# Patient Record
Sex: FEMALE | Race: WHITE | NOT HISPANIC OR LATINO | URBAN - METROPOLITAN AREA
[De-identification: names, ages, dates, MRNs, and addresses within clinical notes are randomized per-mention and may not be internally consistent; named-entity substitution may affect disease eponyms.]

---

## 2018-07-03 DIAGNOSIS — I10 ESSENTIAL HYPERTENSION: Primary | ICD-10-CM

## 2018-07-03 RX ORDER — METOPROLOL TARTRATE 50 MG/1
TABLET, FILM COATED ORAL
Qty: 60 TABLET | Refills: 5 | Status: SHIPPED | OUTPATIENT
Start: 2018-07-03 | End: 2019-01-01 | Stop reason: SDUPTHER

## 2019-01-01 DIAGNOSIS — I10 ESSENTIAL HYPERTENSION: ICD-10-CM

## 2019-01-01 RX ORDER — METOPROLOL TARTRATE 50 MG/1
TABLET, FILM COATED ORAL
Qty: 60 TABLET | Refills: 5 | Status: SHIPPED | OUTPATIENT
Start: 2019-01-01 | End: 2019-06-29 | Stop reason: SDUPTHER

## 2019-02-08 DIAGNOSIS — I10 BENIGN ESSENTIAL HTN: Primary | ICD-10-CM

## 2019-02-08 RX ORDER — AMLODIPINE BESYLATE 5 MG/1
TABLET ORAL
Qty: 90 TABLET | Refills: 3 | Status: SHIPPED | OUTPATIENT
Start: 2019-02-08 | End: 2020-02-09

## 2019-06-29 DIAGNOSIS — I10 ESSENTIAL HYPERTENSION: ICD-10-CM

## 2019-06-29 RX ORDER — METOPROLOL TARTRATE 50 MG/1
TABLET, FILM COATED ORAL
Qty: 60 TABLET | Refills: 5 | Status: SHIPPED | OUTPATIENT
Start: 2019-06-29 | End: 2019-12-30 | Stop reason: SDUPTHER

## 2019-12-30 DIAGNOSIS — I10 ESSENTIAL HYPERTENSION: ICD-10-CM

## 2020-01-03 RX ORDER — METOPROLOL TARTRATE 50 MG/1
TABLET, FILM COATED ORAL
Qty: 60 TABLET | Refills: 5 | Status: SHIPPED | OUTPATIENT
Start: 2020-01-03 | End: 2020-09-16 | Stop reason: SDUPTHER

## 2020-02-09 DIAGNOSIS — I10 BENIGN ESSENTIAL HTN: ICD-10-CM

## 2020-02-09 RX ORDER — AMLODIPINE BESYLATE 5 MG/1
TABLET ORAL
Qty: 90 TABLET | Refills: 3 | Status: SHIPPED | OUTPATIENT
Start: 2020-02-09 | End: 2020-09-18 | Stop reason: SDUPTHER

## 2020-09-16 DIAGNOSIS — I10 ESSENTIAL HYPERTENSION: ICD-10-CM

## 2020-09-16 RX ORDER — METOPROLOL TARTRATE 50 MG/1
50 TABLET, FILM COATED ORAL 2 TIMES DAILY WITH MEALS
Qty: 14 TABLET | Refills: 0 | Status: SHIPPED | OUTPATIENT
Start: 2020-09-16 | End: 2020-09-18 | Stop reason: SDUPTHER

## 2020-09-16 NOTE — TELEPHONE ENCOUNTER
refill   Received: Today   Message Contents   [facility] Clinical               Metoprolol tartrate 50 mg bid     [facility], [address] [facility] Vera Fuller     Just made a virtual appt for her for Friday

## 2020-09-18 ENCOUNTER — TELEMEDICINE (OUTPATIENT)
Dept: CARDIOLOGY CLINIC | Facility: CLINIC | Age: 70
End: 2020-09-18
Payer: MEDICARE

## 2020-09-18 VITALS — HEART RATE: 64 BPM | SYSTOLIC BLOOD PRESSURE: 135 MMHG | WEIGHT: 218 LBS | DIASTOLIC BLOOD PRESSURE: 80 MMHG

## 2020-09-18 DIAGNOSIS — I10 ESSENTIAL HYPERTENSION: ICD-10-CM

## 2020-09-18 DIAGNOSIS — I10 BENIGN ESSENTIAL HTN: ICD-10-CM

## 2020-09-18 PROCEDURE — 99213 OFFICE O/P EST LOW 20 MIN: CPT | Performed by: INTERNAL MEDICINE

## 2020-09-18 RX ORDER — METOPROLOL TARTRATE 50 MG/1
50 TABLET, FILM COATED ORAL 2 TIMES DAILY WITH MEALS
Qty: 180 TABLET | Refills: 5 | Status: SHIPPED | OUTPATIENT
Start: 2020-09-18 | End: 2021-12-12

## 2020-09-18 RX ORDER — AMLODIPINE BESYLATE 5 MG/1
5 TABLET ORAL DAILY
Qty: 90 TABLET | Refills: 5 | Status: SHIPPED | OUTPATIENT
Start: 2020-09-18 | End: 2021-11-04

## 2020-09-18 NOTE — PROGRESS NOTES
Virtual Regular Visit      Assessment/Plan:    Problem List Items Addressed This Visit        Cardiovascular and Mediastinum    Benign essential HTN     Has been well controlled at home  She has not seen a primary care physician in several years  I encouraged her to do so but in the meantime I am happy to renew her medications  Relevant Medications    amLODIPine (NORVASC) 5 mg tablet    metoprolol tartrate (LOPRESSOR) 50 mg tablet      Other Visit Diagnoses     Essential hypertension        Relevant Medications    amLODIPine (NORVASC) 5 mg tablet    metoprolol tartrate (LOPRESSOR) 50 mg tablet            Follow up Plan:  1 year EKG and follow-up visit  I told her that if she sees her primary care physician routinely ankle gets the meds renewed through them follow-up with me can be as needed  HPI:  Patient is seen in follow-up regarding the above  This is a video visit  She has not been seen by physician in several years  Blood pressure at home has been okay  I last saw her in 2018  This was for hypertension but she also had a prior trauma and  subdural hematoma  No recent change in exertional tolerance  No change in weight or appetite    Most recent or relevant cardiac/vascular testing:    Echocardiogram 03/12/2012:  Normal   EKG 02/20/2012:  Sinus tachycardia  Otherwise normal       History reviewed  No pertinent surgical history  CMP: No results found for: NA, K, CL, CO2, BUN, CREATININE, GLUCOSE, EGFR    Lipid Profile: No results found for: CHOL, TRIG, HDL, LDL      Review of Systems   10  point ROS  was otherwise non pertinent or negative except as per HPI or as below  Gait: Normal     Objective:       PHYSICAL EXAM:    This was a virtual visit and no formal exam could be performed  Home vitals were: /80   Pulse 64   Wt 98 9 kg (218 lb)     Animated  No breathlessness  Alert and oriented        Current Outpatient Medications:     amLODIPine (NORVASC) 5 mg tablet, Take 1 tablet (5 mg total) by mouth daily, Disp: 90 tablet, Rfl: 5    metoprolol tartrate (LOPRESSOR) 50 mg tablet, Take 1 tablet (50 mg total) by mouth 2 (two) times a day with meals, Disp: 180 tablet, Rfl: 5  Allergies not on file  History reviewed  No pertinent past medical history  Social History     Tobacco Use   Smoking Status Not on file                  Reason for visit is   Chief Complaint   Patient presents with    Hypertension    Virtual Regular Visit        Encounter provider Andrew Gonzalez MD    Provider located at [address] [address]      Recent Visits  No visits were found meeting these conditions  Showing recent visits within past 7 days and meeting all other requirements     Today's Visits  Date Type Provider Dept   09/18/20 Telemedicine Andrew Gonzalez MD [facility] today's visits and meeting all other requirements     Future Appointments  No visits were found meeting these conditions  Showing future appointments within next 150 days and meeting all other requirements        The patient was identified by name and date of birth  Emily Mendoza was informed that this is a telemedicine visit and that the visit is being conducted through [employer]  My office door was closed  No one else was in the room  She acknowledged consent and understanding of privacy and security of the video platform  The patient has agreed to participate and understands they can discontinue the visit at any time  Patient is aware this is a billable service  Subjective  Emily Mendoza is a 72 y o  female     HPI     History reviewed  No pertinent past medical history  History reviewed  No pertinent surgical history      Current Outpatient Medications   Medication Sig Dispense Refill    amLODIPine (NORVASC) 5 mg tablet Take 1 tablet (5 mg total) by mouth daily 90 tablet 5    metoprolol tartrate (LOPRESSOR) 50 mg tablet Take 1 tablet (50 mg total) by mouth 2 (two) times a day with meals 180 tablet 5     No current facility-administered medications for this visit  Allergies not on file    Review of Systems    Video Exam    Vitals:    09/18/20 1020   BP: 135/80   Pulse: 64   Weight: 98 9 kg (218 lb)       Physical Exam     I spent 20 minutes directly with the patient during this visit      VIRTUAL VISIT DISCLAIMER    Virginia Mix acknowledges that she has consented to an online visit or consultation  She understands that the online visit is based solely on information provided by her, and that, in the absence of a face-to-face physical evaluation by the physician, the diagnosis she receives is both limited and provisional in terms of accuracy and completeness  This is not intended to replace a full medical face-to-face evaluation by the physician  Virginia Mix understands and accepts these terms

## 2020-09-18 NOTE — ASSESSMENT & PLAN NOTE
Has been well controlled at home  She has not seen a primary care physician in several years  I encouraged her to do so but in the meantime I am happy to renew her medications 
Parent(s)

## 2021-11-04 DIAGNOSIS — I10 BENIGN ESSENTIAL HTN: ICD-10-CM

## 2021-11-04 RX ORDER — AMLODIPINE BESYLATE 5 MG/1
TABLET ORAL
Qty: 90 TABLET | Refills: 5 | Status: SHIPPED | OUTPATIENT
Start: 2021-11-04

## 2021-12-11 DIAGNOSIS — I10 ESSENTIAL HYPERTENSION: ICD-10-CM

## 2021-12-12 RX ORDER — METOPROLOL TARTRATE 50 MG/1
TABLET, FILM COATED ORAL
Qty: 180 TABLET | Refills: 5 | Status: SHIPPED | OUTPATIENT
Start: 2021-12-12 | End: 2022-04-13 | Stop reason: DRUGHIGH

## 2022-04-09 ENCOUNTER — APPOINTMENT (EMERGENCY)
Dept: RADIOLOGY | Facility: HOSPITAL | Age: 72
End: 2022-04-09
Payer: MEDICARE

## 2022-04-09 ENCOUNTER — HOSPITAL ENCOUNTER (EMERGENCY)
Facility: HOSPITAL | Age: 72
Discharge: HOME | End: 2022-04-09
Attending: EMERGENCY MEDICINE | Admitting: EMERGENCY MEDICINE
Payer: MEDICARE

## 2022-04-09 ENCOUNTER — APPOINTMENT (EMERGENCY)
Dept: CT IMAGING | Facility: HOSPITAL | Age: 72
End: 2022-04-09
Payer: MEDICARE

## 2022-04-09 VITALS
WEIGHT: 218 LBS | HEIGHT: 64 IN | HEART RATE: 58 BPM | TEMPERATURE: 98.2 F | RESPIRATION RATE: 18 BRPM | BODY MASS INDEX: 37.22 KG/M2 | OXYGEN SATURATION: 93 % | SYSTOLIC BLOOD PRESSURE: 217 MMHG | DIASTOLIC BLOOD PRESSURE: 98 MMHG

## 2022-04-09 DIAGNOSIS — I10 ASYMPTOMATIC HYPERTENSION: Primary | ICD-10-CM

## 2022-04-09 DIAGNOSIS — R73.9 HYPERGLYCEMIA: ICD-10-CM

## 2022-04-09 DIAGNOSIS — Z13.220 SCREENING FOR LIPID DISORDERS: ICD-10-CM

## 2022-04-09 LAB
2HR DELTA HS TROPONIN: 0 NG/L
ALBUMIN SERPL BCP-MCNC: 3.1 G/DL (ref 3.5–5)
ALP SERPL-CCNC: 128 U/L (ref 46–116)
ALT SERPL W P-5'-P-CCNC: 30 U/L (ref 12–78)
ANION GAP SERPL CALCULATED.3IONS-SCNC: 8 MMOL/L (ref 4–13)
AST SERPL W P-5'-P-CCNC: 19 U/L (ref 5–45)
BACTERIA UR QL AUTO: ABNORMAL /HPF
BASOPHILS # BLD AUTO: 0.05 THOUSANDS/ΜL (ref 0–0.1)
BASOPHILS NFR BLD AUTO: 1 % (ref 0–1)
BILIRUB SERPL-MCNC: 0.73 MG/DL (ref 0.2–1)
BILIRUB UR QL STRIP: NEGATIVE
BUN SERPL-MCNC: 16 MG/DL (ref 5–25)
CALCIUM ALBUM COR SERPL-MCNC: 9.7 MG/DL (ref 8.3–10.1)
CALCIUM SERPL-MCNC: 9 MG/DL (ref 8.3–10.1)
CARDIAC TROPONIN I PNL SERPL HS: 9 NG/L
CARDIAC TROPONIN I PNL SERPL HS: 9 NG/L
CHLORIDE SERPL-SCNC: 99 MMOL/L (ref 100–108)
CLARITY UR: CLEAR
CO2 SERPL-SCNC: 28 MMOL/L (ref 21–32)
COLOR UR: YELLOW
CREAT SERPL-MCNC: 1 MG/DL (ref 0.6–1.3)
EOSINOPHIL # BLD AUTO: 0.31 THOUSAND/ΜL (ref 0–0.61)
EOSINOPHIL NFR BLD AUTO: 4 % (ref 0–6)
ERYTHROCYTE [DISTWIDTH] IN BLOOD BY AUTOMATED COUNT: 12.9 % (ref 11.6–15.1)
EST. AVERAGE GLUCOSE BLD GHB EST-MCNC: 292 MG/DL
GFR SERPL CREATININE-BSD FRML MDRD: 56 ML/MIN/1.73SQ M
GLUCOSE SERPL-MCNC: 300 MG/DL (ref 65–140)
GLUCOSE SERPL-MCNC: 358 MG/DL (ref 65–140)
GLUCOSE UR STRIP-MCNC: ABNORMAL MG/DL
HBA1C MFR BLD: 11.8 %
HCT VFR BLD AUTO: 40.6 % (ref 34.8–46.1)
HGB BLD-MCNC: 13.8 G/DL (ref 11.5–15.4)
HGB UR QL STRIP.AUTO: NEGATIVE
IMM GRANULOCYTES # BLD AUTO: 0.05 THOUSAND/UL (ref 0–0.2)
IMM GRANULOCYTES NFR BLD AUTO: 1 % (ref 0–2)
KETONES UR STRIP-MCNC: NEGATIVE MG/DL
LEUKOCYTE ESTERASE UR QL STRIP: ABNORMAL
LYMPHOCYTES # BLD AUTO: 1.58 THOUSANDS/ΜL (ref 0.6–4.47)
LYMPHOCYTES NFR BLD AUTO: 22 % (ref 14–44)
MCH RBC QN AUTO: 30.1 PG (ref 26.8–34.3)
MCHC RBC AUTO-ENTMCNC: 34 G/DL (ref 31.4–37.4)
MCV RBC AUTO: 89 FL (ref 82–98)
MONOCYTES # BLD AUTO: 0.75 THOUSAND/ΜL (ref 0.17–1.22)
MONOCYTES NFR BLD AUTO: 10 % (ref 4–12)
MUCOUS THREADS UR QL AUTO: ABNORMAL
NEUTROPHILS # BLD AUTO: 4.5 THOUSANDS/ΜL (ref 1.85–7.62)
NEUTS SEG NFR BLD AUTO: 62 % (ref 43–75)
NITRITE UR QL STRIP: NEGATIVE
NON-SQ EPI CELLS URNS QL MICRO: ABNORMAL /HPF
NRBC BLD AUTO-RTO: 0 /100 WBCS
OTHER STN SPEC: ABNORMAL
PH UR STRIP.AUTO: 7 [PH] (ref 4.5–8)
PLATELET # BLD AUTO: 266 THOUSANDS/UL (ref 149–390)
PMV BLD AUTO: 10.8 FL (ref 8.9–12.7)
POTASSIUM SERPL-SCNC: 3.8 MMOL/L (ref 3.5–5.3)
PROT SERPL-MCNC: 7.7 G/DL (ref 6.4–8.2)
PROT UR STRIP-MCNC: ABNORMAL MG/DL
RBC # BLD AUTO: 4.59 MILLION/UL (ref 3.81–5.12)
RBC #/AREA URNS AUTO: ABNORMAL /HPF
SODIUM SERPL-SCNC: 135 MMOL/L (ref 136–145)
SP GR UR STRIP.AUTO: 1.02 (ref 1–1.03)
TSH SERPL DL<=0.05 MIU/L-ACNC: 2.03 UIU/ML (ref 0.45–4.5)
UROBILINOGEN UR QL STRIP.AUTO: 0.2 E.U./DL
WBC # BLD AUTO: 7.24 THOUSAND/UL (ref 4.31–10.16)
WBC #/AREA URNS AUTO: ABNORMAL /HPF

## 2022-04-09 PROCEDURE — 81001 URINALYSIS AUTO W/SCOPE: CPT

## 2022-04-09 PROCEDURE — 82948 REAGENT STRIP/BLOOD GLUCOSE: CPT

## 2022-04-09 PROCEDURE — 80053 COMPREHEN METABOLIC PANEL: CPT | Performed by: PHYSICIAN ASSISTANT

## 2022-04-09 PROCEDURE — 70450 CT HEAD/BRAIN W/O DYE: CPT

## 2022-04-09 PROCEDURE — G1004 CDSM NDSC: HCPCS

## 2022-04-09 PROCEDURE — 84443 ASSAY THYROID STIM HORMONE: CPT | Performed by: PHYSICIAN ASSISTANT

## 2022-04-09 PROCEDURE — 96361 HYDRATE IV INFUSION ADD-ON: CPT

## 2022-04-09 PROCEDURE — 96360 HYDRATION IV INFUSION INIT: CPT

## 2022-04-09 PROCEDURE — 80061 LIPID PANEL: CPT

## 2022-04-09 PROCEDURE — 83036 HEMOGLOBIN GLYCOSYLATED A1C: CPT | Performed by: PHYSICIAN ASSISTANT

## 2022-04-09 PROCEDURE — 84484 ASSAY OF TROPONIN QUANT: CPT | Performed by: PHYSICIAN ASSISTANT

## 2022-04-09 PROCEDURE — 99285 EMERGENCY DEPT VISIT HI MDM: CPT

## 2022-04-09 PROCEDURE — 93005 ELECTROCARDIOGRAM TRACING: CPT

## 2022-04-09 PROCEDURE — 85025 COMPLETE CBC W/AUTO DIFF WBC: CPT | Performed by: PHYSICIAN ASSISTANT

## 2022-04-09 PROCEDURE — 99285 EMERGENCY DEPT VISIT HI MDM: CPT | Performed by: PHYSICIAN ASSISTANT

## 2022-04-09 PROCEDURE — 83721 ASSAY OF BLOOD LIPOPROTEIN: CPT

## 2022-04-09 PROCEDURE — 71045 X-RAY EXAM CHEST 1 VIEW: CPT

## 2022-04-09 PROCEDURE — 36415 COLL VENOUS BLD VENIPUNCTURE: CPT | Performed by: PHYSICIAN ASSISTANT

## 2022-04-09 RX ORDER — AMLODIPINE BESYLATE 5 MG/1
5 TABLET ORAL ONCE
Status: COMPLETED | OUTPATIENT
Start: 2022-04-09 | End: 2022-04-09

## 2022-04-09 RX ORDER — METOPROLOL TARTRATE 50 MG/1
50 TABLET, FILM COATED ORAL ONCE
Status: COMPLETED | OUTPATIENT
Start: 2022-04-09 | End: 2022-04-09

## 2022-04-09 RX ADMIN — SODIUM CHLORIDE 1000 ML: 0.9 INJECTION, SOLUTION INTRAVENOUS at 13:36

## 2022-04-09 RX ADMIN — METFORMIN HYDROCHLORIDE 500 MG: 500 TABLET ORAL at 16:39

## 2022-04-09 RX ADMIN — METOPROLOL TARTRATE 50 MG: 50 TABLET, FILM COATED ORAL at 16:39

## 2022-04-09 RX ADMIN — AMLODIPINE BESYLATE 5 MG: 5 TABLET ORAL at 14:48

## 2022-04-09 NOTE — ED PROVIDER NOTES
History  Chief Complaint   Patient presents with    Altered Mental Status     patient's daughter states that she has been "off" for the last few days and slower than normal, not confused     Patient is a 67-year-old female with history of hypertension who presents to the emergency department with her daughter for evaluation of altered mental status  The patient herself has no complaints  She states that she feels completely fine  The patient's daughter states that she has noticed that over the last few days, her mother has been off  She feels that she has been slower to respond and not acting like herself  Per the daughter, her mother lives with her at home and takes care of her child  The patient's daughter states that she ultimately made her mother come to the emergency department for evaluation because she is very concerned  The patient states that she was recently constipated, but that has since resolved  She denies fever, chills, chest pain, shortness of breath, abdominal pain, nausea, vomiting, headache, dizziness or lightheadedness  History provided by:  Patient   used: No    Altered Mental Status  Presenting symptoms: confusion    Associated symptoms: no abdominal pain, no fever, no headaches, no light-headedness, no nausea, no rash and no vomiting        Prior to Admission Medications   Prescriptions Last Dose Informant Patient Reported? Taking? amLODIPine (NORVASC) 5 mg tablet 4/9/2022 at Unknown time  No Yes   Sig: TAKE ONE TABLET BY MOUTH EVERY DAY   metoprolol tartrate (LOPRESSOR) 50 mg tablet 4/9/2022 at Unknown time  No Yes   Sig: TAKE ONE TABLET BY MOUTH TWICE A DAY WITH MEALS      Facility-Administered Medications: None       Past Medical History:   Diagnosis Date    Hypertension        Past Surgical History:   Procedure Laterality Date    ABDOMINAL SURGERY         History reviewed  No pertinent family history    I have reviewed and agree with the history as documented  E-Cigarette/Vaping    E-Cigarette Use Never User      E-Cigarette/Vaping Substances    Nicotine No     THC No     CBD No     Flavoring No     Other No     Unknown No      Social History     Tobacco Use    Smoking status: Never Smoker    Smokeless tobacco: Never Used   Vaping Use    Vaping Use: Never used   Substance Use Topics    Alcohol use: Yes     Alcohol/week: 1 0 standard drink     Types: 1 Glasses of wine per week     Comment: 1 glass of wine weekly    Drug use: Never       Review of Systems   Constitutional: Negative for chills and fever  HENT: Negative for ear pain and sore throat  Eyes: Negative for redness and visual disturbance  Respiratory: Negative for cough and shortness of breath  Cardiovascular: Negative for chest pain  Gastrointestinal: Negative for abdominal pain, diarrhea, nausea and vomiting  Genitourinary: Negative for dysuria and hematuria  Musculoskeletal: Negative for back pain, neck pain and neck stiffness  Skin: Negative for color change and rash  Neurological: Negative for dizziness, light-headedness and headaches  Psychiatric/Behavioral: Positive for confusion  All other systems reviewed and are negative  Physical Exam  Physical Exam  Vitals and nursing note reviewed  Constitutional:       General: She is not in acute distress  Appearance: She is well-developed  She is not ill-appearing or toxic-appearing  HENT:      Head: Normocephalic and atraumatic  Mouth/Throat:      Pharynx: Uvula midline  Eyes:      General: Lids are normal       Conjunctiva/sclera: Conjunctivae normal    Cardiovascular:      Rate and Rhythm: Normal rate and regular rhythm  Heart sounds: Normal heart sounds  Pulmonary:      Effort: Pulmonary effort is normal       Breath sounds: Normal breath sounds  Abdominal:      General: There is no distension  Palpations: Abdomen is soft  Tenderness: There is no abdominal tenderness  Musculoskeletal:      Cervical back: Normal range of motion and neck supple  Skin:     General: Skin is warm and dry  Neurological:      Mental Status: She is alert and oriented to person, place, and time  Cranial Nerves: Cranial nerves are intact  Sensory: Sensation is intact  Motor: Motor function is intact  Coordination: Coordination is intact  Gait: Gait is intact           Vital Signs  ED Triage Vitals   Temperature Pulse Respirations Blood Pressure SpO2   04/09/22 1157 04/09/22 1157 04/09/22 1157 04/09/22 1157 04/09/22 1157   98 2 °F (36 8 °C) 66 18 (!) 169/101 97 %      Temp Source Heart Rate Source Patient Position - Orthostatic VS BP Location FiO2 (%)   04/09/22 1157 04/09/22 1157 04/09/22 1400 04/09/22 1400 --   Oral Monitor Lying Right arm       Pain Score       04/09/22 1157       No Pain           Vitals:    04/09/22 1500 04/09/22 1546 04/09/22 1600 04/09/22 1640   BP: (!) 258/120 (!) 231/111 (!) 230/98 (!) 217/98   Pulse: 60  58    Patient Position - Orthostatic VS: Lying Lying Lying Lying         Visual Acuity  Visual Acuity      Most Recent Value   L Pupil Size (mm) 3   R Pupil Size (mm) 3          ED Medications  Medications   sodium chloride 0 9 % bolus 1,000 mL (0 mL Intravenous Stopped 4/9/22 1639)   amLODIPine (NORVASC) tablet 5 mg (5 mg Oral Given 4/9/22 1448)   metoprolol tartrate (LOPRESSOR) tablet 50 mg (50 mg Oral Given 4/9/22 1639)   metFORMIN (GLUCOPHAGE) tablet 500 mg (500 mg Oral Given 4/9/22 1639)       Diagnostic Studies  Results Reviewed     Procedure Component Value Units Date/Time    Urine Microscopic [[id number]]  (Abnormal) Collected: 04/09/22 1507    Lab Status: Final result Specimen: Urine, Clean Catch Updated: 04/09/22 1621     RBC, UA None Seen /hpf      WBC, UA 0-1 /hpf      Epithelial Cells Occasional /hpf      Bacteria, UA Occasional /hpf      OTHER OBSERVATIONS Yeast Cells Present     MUCUS THREADS Occasional    HS Troponin I 2hr [[id number]] (Normal) Collected: 04/09/22 1456    Lab Status: Final result Specimen: Blood from Arm, Right Updated: 04/09/22 1544     hs TnI 2hr 9 ng/L      Delta 2hr hsTnI 0 ng/L     Urine Macroscopic, POC [[id number]]  (Abnormal) Collected: 04/09/22 1507    Lab Status: Final result Specimen: Urine Updated: 04/09/22 1508     Color, UA Yellow     Clarity, UA Clear     pH, UA 7 0     Leukocytes, UA Elevated glucose may cause decreased leukocyte values  See urine microscopic for [facility] result/     Nitrite, UA Negative     Protein, UA 30 (1+) mg/dl      Glucose, UA >=1000 (1%) mg/dl      Ketones, UA Negative mg/dl      Urobilinogen, UA 0 2 E U /dl      Bilirubin, UA Negative     Blood, UA Negative     Specific Gravity, UA 1 020    Narrative:      CLINITEK RESULT    Hemoglobin A1C [[id number]] Collected: 04/09/22 1234    Lab Status: In process Specimen: Blood from Arm, Right Updated: 04/09/22 1508    Fingerstick Glucose (POCT) [[id number]]  (Abnormal) Collected: 04/09/22 1448    Lab Status: Final result Updated: 04/09/22 1501     POC Glucose 300 mg/dl     TSH [[id number]]  (Normal) Collected: 04/09/22 1234    Lab Status: Final result Specimen: Blood from Arm, Right Updated: 04/09/22 1310     TSH 3RD GENERATON 2 027 uIU/mL     Narrative:      Patients undergoing fluorescein dye angiography may retain small amounts of fluorescein in the body for 48-72 hours post procedure  Samples containing fluorescein can produce falsely depressed TSH values  If the patient had this procedure,a specimen should be resubmitted post fluorescein clearance        HS Troponin 0hr (reflex protocol) [[id number]]  (Normal) Collected: 04/09/22 1234    Lab Status: Final result Specimen: Blood from Arm, Right Updated: 04/09/22 1309     hs TnI 0hr 9 ng/L     Comprehensive metabolic panel [[id number]]  (Abnormal) Collected: 04/09/22 1234    Lab Status: Final result Specimen: Blood from Arm, Right Updated: 04/09/22 1302     Sodium 135 mmol/L      Potassium 3 8 mmol/L Chloride 99 mmol/L      CO2 28 mmol/L      ANION GAP 8 mmol/L      BUN 16 mg/dL      Creatinine 1 00 mg/dL      Glucose 358 mg/dL      Calcium 9 0 mg/dL      Corrected Calcium 9 7 mg/dL      AST 19 U/L      ALT 30 U/L      Alkaline Phosphatase 128 U/L      Total Protein 7 7 g/dL      Albumin 3 1 g/dL      Total Bilirubin 0 73 mg/dL      eGFR 56 ml/min/1 73sq m     Narrative:      National Kidney Disease Foundation guidelines for Chronic Kidney Disease (CKD):     Stage 1 with normal or high GFR (GFR > 90 mL/min/1 73 square meters)    Stage 2 Mild CKD (GFR = 60-89 mL/min/1 73 square meters)    Stage 3A Moderate CKD (GFR = 45-59 mL/min/1 73 square meters)    Stage 3B Moderate CKD (GFR = 30-44 mL/min/1 73 square meters)    Stage 4 Severe CKD (GFR = 15-29 mL/min/1 73 square meters)    Stage 5 End Stage CKD (GFR <15 mL/min/1 73 square meters)  Note: GFR calculation is accurate only with a steady state creatinine    CBC and differential [[id number]] Collected: 04/09/22 1234    Lab Status: Final result Specimen: Blood from Arm, Right Updated: 04/09/22 1245     WBC 7 24 Thousand/uL      RBC 4 59 Million/uL      Hemoglobin 13 8 g/dL      Hematocrit 40 6 %      MCV 89 fL      MCH 30 1 pg      MCHC 34 0 g/dL      RDW 12 9 %      MPV 10 8 fL      Platelets 499 Thousands/uL      nRBC 0 /100 WBCs      Neutrophils Relative 62 %      Immat GRANS % 1 %      Lymphocytes Relative 22 %      Monocytes Relative 10 %      Eosinophils Relative 4 %      Basophils Relative 1 %      Neutrophils Absolute 4 50 Thousands/µL      Immature Grans Absolute 0 05 Thousand/uL      Lymphocytes Absolute 1 58 Thousands/µL      Monocytes Absolute 0 75 Thousand/µL      Eosinophils Absolute 0 31 Thousand/µL      Basophils Absolute 0 05 Thousands/µL                  XR chest 1 view portable   ED Interpretation by Maggi Gibson PA-C (04/09 7555)   No acute cardiopulmonary disease      CT head without contrast   Final Result by Bala Pereira MD (04/09 1253)      No acute intracranial abnormality  Workstation performed: [id number]                    Procedures  ECG 12 Lead Documentation Only    Date/Time: 4/9/2022 12:41 PM  Performed by: Deshaun Julio PA-C  Authorized by: Betty Waggoner PA-C     Comments:      Normal sinus rhythm at 60  Normal axis  Right bundle-branch block  No acute ST-T changes  No previous EKG available for comparison  ED Course  ED Course as of 04/09/22 1706   Sat Apr 09, 2022   1319 Glucose, Random(!): 358   1345 Anion Gap: 8   1458 5 mg amlodipine ordered due to persistently elevated blood pressure  Patient continues to deny any symptoms  1547 POC Glucose(!): 300   1547 Delta 2hr hsTnI: 0   1633 Patient's blood pressure is steadily improving, however remains significantly elevated  Patient continues to remain completely asymptomatic at this time  I did offer to admit her for observation into the hospital, however she wishes to go home  Patient will get her nighttime dose of metoprolol  I will also give her her 1st dose of metformin  She understands the need for close follow-up  SBIRT 22yo+      Most Recent Value   SBIRT (22 yo +)    In order to provide better care to our patients, we are screening all of our patients for alcohol and drug use  Would it be okay to ask you these screening questions? Yes Filed at: 04/09/2022 1208   Initial Alcohol Screen: US AUDIT-C     1  How often do you have a drink containing alcohol? 0 Filed at: 04/09/2022 1208   2  How many drinks containing alcohol do you have on a typical day you are drinking? 0 Filed at: 04/09/2022 1208   3a  Male UNDER 65: How often do you have five or more drinks on one occasion? 0 Filed at: 04/09/2022 1208   3b  FEMALE Any Age, or MALE 65+: How often do you have 4 or more drinks on one occassion?  0 Filed at: 04/09/2022 1208   Audit-C Score 0 Filed at: 04/09/2022 1208   JENSEN: How many times in the past year have you    Used an illegal drug or used a prescription medication for non-medical reasons? Never Filed at: 04/09/2022 1208                    MDM  Number of Diagnoses or Management Options  Asymptomatic hypertension: new and requires workup  Hyperglycemia: new and requires workup  Diagnosis management comments: The patient presents for evaluation of altered mental status  The patient herself has no complaints  She is alert and oriented  The patient's daughter has noted some subtle changes over the last few days that are concerning her  On my exam, the patient is awake, alert and oriented  Neurologic exam is without any deficit  Differential includes but is not limited to UTI versus pneumonia versus early dementia versus ACS versus TIA versus CVA  Labs, imaging and EKG were ordered  Labs reviewed  Notable for elevated blood glucose level at 358  Patient does not have prior diabetes diagnosis  Per her daughter, the patient has not been to see a doctor in person in over 4 years  Patient was given a L of fluids, and blood sugar level did improved to 300  Patient will be started on metformin  Patient's labs were otherwise unremarkable  She had 2 troponins completed while in the ED, both of which were within normal limits with the delta change of 0  EKG is without acute ischemic change  CT of head is without any acute findings  Of note, the patient's blood pressure did spike while in the ED  She is adamantly denying any symptoms  She states that she has been compliant with her blood pressure medications  I did give her an additional 5 mg of amlodipine  There was slow but steady improvement of patient's blood pressure  I did ultimately discuss admission with the patient, however she wishes to be discharged home today    She understands that her blood pressure is still significantly elevated, and that she will require close follow-up for both that as well as new diagnosis of diabetes  I did give the patient a prescription for course of metformin  She is requesting that I give her information for primary care doctors  in the area as her current primary care doctor is an hour away  I provided information per her request     Patient was given her 1st dose of metformin as well as her evening dose of metoprolol prior to leaving the emergency department today  She was given strict return to ED precautions for any worsening symptoms or if she develops chest pain, shortness of breath, dizziness or headache  She acknowledged understanding  Patient is stable for discharge  Amount and/or Complexity of Data Reviewed  Clinical lab tests: ordered and reviewed  Tests in the radiology section of CPT®: ordered and reviewed  Decide to obtain previous medical records or to obtain history from someone other than the patient: yes  Obtain history from someone other than the patient: yes  Review and summarize past medical records: yes  Discuss the patient with other providers: yes (Dr Shivani Carmona)    Risk of Complications, Morbidity, and/or Mortality  Presenting problems: moderate  Diagnostic procedures: low  Management options: low    Patient Progress  Patient progress: improved      Disposition  Final diagnoses:   Asymptomatic hypertension   Hyperglycemia     Time reflects when diagnosis was documented in both MDM as applicable and the Disposition within this note     Time User Action Codes Description Comment    4/9/2022  4:43 PM Mariia Peterson [I10] Asymptomatic hypertension     4/9/2022  4:44 PM Mariia Peterson [R73 9] Hyperglycemia       ED Disposition     ED Disposition Condition Date/Time Comment    Discharge Stable Sat Apr 9, 2022  4:43 PM Cyndy Caballero discharge to home/self care              Follow-up Information     Follow up With Specialties Details Why Contact Info Additional Information    [facility] [place] [facility] Schedule an appointment as soon as possible for a visit in 3 days  [address] [address]  [phone] DO SCG[facility] Internal [facility] [address] [place] [address], [address] [place] [address] Medicine Schedule an appointment as soon as possible for a visit in 3 days  [address], [address] [address]  [address] [place] [address] [address], [place], [facility] Tamra Caciola 1159 [phone]          Discharge Medication List as of 4/9/2022  4:46 PM      START taking these medications    Details   metFORMIN (GLUCOPHAGE) 500 mg tablet Take 1 tablet (500 mg total) by mouth 2 (two) times a day with meals, Starting Sat 4/9/2022, Normal         CONTINUE these medications which have NOT CHANGED    Details   amLODIPine (NORVASC) 5 mg tablet TAKE ONE TABLET BY MOUTH EVERY DAY, Normal      metoprolol tartrate (LOPRESSOR) 50 mg tablet TAKE ONE TABLET BY MOUTH TWICE A DAY WITH MEALS, Normal             No discharge procedures on file      PDMP Review     None          ED Provider  Electronically Signed by           Ifrah Pittman PA-C  04/09/22 7282

## 2022-04-12 ENCOUNTER — OFFICE VISIT (OUTPATIENT)
Dept: FAMILY MEDICINE CLINIC | Facility: CLINIC | Age: 72
End: 2022-04-12
Payer: MEDICARE

## 2022-04-12 VITALS
SYSTOLIC BLOOD PRESSURE: 122 MMHG | HEIGHT: 64 IN | HEART RATE: 70 BPM | OXYGEN SATURATION: 97 % | WEIGHT: 195.1 LBS | DIASTOLIC BLOOD PRESSURE: 80 MMHG | RESPIRATION RATE: 15 BRPM | BODY MASS INDEX: 33.31 KG/M2

## 2022-04-12 DIAGNOSIS — Z12.11 SCREENING FOR MALIGNANT NEOPLASM OF COLON: ICD-10-CM

## 2022-04-12 DIAGNOSIS — E78.2 MIXED HYPERLIPIDEMIA: ICD-10-CM

## 2022-04-12 DIAGNOSIS — Z13.220 SCREENING FOR LIPID DISORDERS: ICD-10-CM

## 2022-04-12 DIAGNOSIS — Z12.31 ENCOUNTER FOR SCREENING MAMMOGRAM FOR MALIGNANT NEOPLASM OF BREAST: ICD-10-CM

## 2022-04-12 DIAGNOSIS — E11.9 TYPE 2 DIABETES MELLITUS WITHOUT COMPLICATION, WITHOUT LONG-TERM CURRENT USE OF INSULIN (HCC): Primary | ICD-10-CM

## 2022-04-12 DIAGNOSIS — E66.01 OBESITY, MORBID (HCC): ICD-10-CM

## 2022-04-12 DIAGNOSIS — Z78.0 MENOPAUSE: ICD-10-CM

## 2022-04-12 DIAGNOSIS — N18.30 STAGE 3 CHRONIC KIDNEY DISEASE, UNSPECIFIED WHETHER STAGE 3A OR 3B CKD (HCC): ICD-10-CM

## 2022-04-12 DIAGNOSIS — Z79.899 ON STATIN THERAPY: ICD-10-CM

## 2022-04-12 PROBLEM — S06.5XAA SDH (SUBDURAL HEMATOMA): Status: ACTIVE | Noted: 2018-05-02

## 2022-04-12 PROBLEM — I10 HTN (HYPERTENSION): Status: RESOLVED | Noted: 2018-05-03 | Resolved: 2022-04-12

## 2022-04-12 PROBLEM — S06.5X9A SDH (SUBDURAL HEMATOMA) (HCC): Status: ACTIVE | Noted: 2018-05-02

## 2022-04-12 PROBLEM — I10 HTN (HYPERTENSION): Status: ACTIVE | Noted: 2018-05-03

## 2022-04-12 PROBLEM — S42.202A CLOSED FRACTURE OF LEFT PROXIMAL HUMERUS: Status: ACTIVE | Noted: 2019-03-29

## 2022-04-12 LAB
CHOLEST SERPL-MCNC: 263 MG/DL
HDLC SERPL-MCNC: 35 MG/DL
LDLC SERPL DIRECT ASSAY-MCNC: 111 MG/DL (ref 0–100)
TRIGL SERPL-MCNC: 632 MG/DL

## 2022-04-12 PROCEDURE — 99204 OFFICE O/P NEW MOD 45 MIN: CPT | Performed by: NURSE PRACTITIONER

## 2022-04-12 RX ORDER — PIOGLITAZONEHYDROCHLORIDE 15 MG/1
15 TABLET ORAL DAILY
Qty: 30 TABLET | Refills: 2 | Status: SHIPPED | OUTPATIENT
Start: 2022-04-12 | End: 2022-06-28 | Stop reason: SDUPTHER

## 2022-04-12 RX ORDER — METFORMIN HYDROCHLORIDE 500 MG/1
1000 TABLET, EXTENDED RELEASE ORAL
Qty: 180 TABLET | Refills: 1 | Status: SHIPPED | OUTPATIENT
Start: 2022-04-12 | End: 2022-06-07 | Stop reason: SDUPTHER

## 2022-04-12 RX ORDER — ATORVASTATIN CALCIUM 40 MG/1
40 TABLET, FILM COATED ORAL EVERY EVENING
Qty: 90 TABLET | Refills: 1 | Status: SHIPPED | OUTPATIENT
Start: 2022-04-12 | End: 2022-04-13 | Stop reason: DRUGHIGH

## 2022-04-12 NOTE — PATIENT INSTRUCTIONS
Dr Bert Riedel Dr  [facility] [place] [address] Bryce Deforest Dr [facility]  10% - bad control"> 10% - bad control,Hemoglobin A1c (HbA1c) greater than 10% indicating poor diabetic control,Haemoglobin A1c greater than 10% indicating poor diabetic control">   Diabetes Mellitus Type 2 in Adults, Ambulatory Care   GENERAL INFORMATION:   Diabetes mellitus type 2  is a disease that affects how your body uses glucose (sugar)  Insulin helps move sugar out of the blood so it can be used for energy  Normally, when the blood sugar level increases, the pancreas makes more insulin  Type 2 diabetes develops because either the body cannot make enough insulin, or it cannot use the insulin correctly  After many years, your pancreas may stop making insulin  Common symptoms include the following:   · More hunger or thirst than usual     · Frequent urination     · Weight loss without trying     · Blurred vision  Seek immediate care for the following symptoms:   · Severe abdominal pain, or pain that spreads to your back  You may also be vomiting  · Trouble staying awake or focusing    · Shaking or sweating    · Blurred or double vision    · Breath has a fruity, sweet smell    · Breathing is deep and labored, or rapid and shallow    · Heartbeat is fast and weak  Treatment for diabetes mellitus type 2  includes keeping your blood sugar at a normal level  You must eat the right foods, and exercise regularly  You may also need medicine if you cannot control your blood sugar level with nutrition and exercise  Manage diabetes mellitus type 2:   · Check your blood sugar level  You will be taught how to check a small drop of blood in a glucose monitor  Ask your healthcare provider when and how often to check during the day  Ask your healthcare provider what your blood sugar levels should be when you check them  · Keep track of carbohydrates (sugar and starchy foods)    Your blood sugar level can get too high if you eat too many carbohydrates  Your dietitian will help you plan meals and snacks that have the right amount of carbohydrates  · Eat low-fat foods  Some examples are skinless chicken and low-fat milk  · Eat less sodium (salt)  Some examples of high-sodium foods to limit are soy sauce, potato chips, and soup  Do not add salt to food you cook  Limit your use of table salt  · Eat high-fiber foods  Foods that are a good source of fiber include vegetables, whole grain bread, and beans  · Limit alcohol  Alcohol affects your blood sugar level and can make it harder to manage your diabetes  Women should limit alcohol to 1 drink a day  Men should limit alcohol to 2 drinks a day  A drink of alcohol is 12 ounces of beer, 5 ounces of wine, or 1½ ounces of liquor  · Get regular exercise  Exercise can help keep your blood sugar level steady, decrease your risk of heart disease, and help you lose weight  Exercise for at least 30 minutes, 5 days a week  Include muscle strengthening activities 2 days each week  Work with your healthcare provider to create an exercise plan  · Check your feet each day  for injuries or open sores  Ask your healthcare provider for activities you can do if you have an open sore  · Quit smoking  If you smoke, it is never too late to quit  Smoking can worsen the problems that may occur with diabetes  Ask your healthcare provider for information about how to stop smoking if you are having trouble quitting  · Ask about your weight:  Ask healthcare providers if you need to lose weight, and how much to lose  Ask them to help you with a weight loss program  Even a 10 to 15 pound weight loss can help you manage your blood sugar level  · Carry medical alert identification  Wear medical alert jewelry or carry a card that says you have diabetes  Ask your healthcare provider where to get these items  · Ask about vaccines    Diabetes puts you at risk of serious illness if you get the flu, pneumonia, or hepatitis  Ask your healthcare provider if you should get a flu, pneumonia, or hepatitis B vaccine, and when to get the vaccine  Follow up with your healthcare provider as directed:  Write down your questions so you remember to ask them during your visits  CARE AGREEMENT:   You have the right to help plan your care  Learn about your health condition and how it may be treated  Discuss treatment options with your caregivers to decide what care you want to receive  You always have the right to refuse treatment  The above information is an  only  It is not intended as medical advice for individual conditions or treatments  Talk to your doctor, nurse or pharmacist before following any medical regimen to see if it is safe and effective for you  © 2014 [address] is for End User's use only and may not be sold, redistributed or otherwise used for commercial purposes  All illustrations and images included in CareNotes® are the copyrighted property of A D A M [employer] Inc  or Marcus Montez  Basic Carbohydrate Counting   AMBULATORY CARE:   Carbohydrate counting  is a way to plan your meals by counting the amount of carbohydrate in foods  Carbohydrates are the sugars, starches, and fiber found in fruit, grains, vegetables, and milk products  Carbohydrates increase your blood sugar levels  Carbohydrate counting can help you eat the right amount of carbohydrate to keep your blood sugar levels under control  What you need to know about planning meals using carbohydrate counting:  · A dietitian or healthcare provider will help you develop a healthy meal plan that works best for you  You will be taught how much carbohydrate to eat or drink for each meal and snack  Your meal plan will be based on your age, weight, usual food intake, and physical activity level   If you have diabetes, it will also include your blood sugar levels and diabetes medicine  Once you know how much carbohydrate you should eat, you can decide what type of food you want to eat  · You will need to know what foods contain carbohydrate and how much they contain  Keep track of the amount of carbohydrate in meals and snacks in order to follow your meal plan  Do not avoid carbohydrates or skip meals  Your blood sugar may fall too low if you do not eat enough carbohydrate or you skip meals  Foods that contain carbohydrate:   · Breads:  Each serving of food listed below contains about 15 g of carbohydrate   ? 1 slice of bread (1 ounce) or 1 flour or corn tortilla (6 inch)    ? ½ of a hamburger bun or ¼ of a large bagel (about 1 ounce)    ? 1 pancake (about 4 inches across and ¼ inch thick)    · Cereals and grains:  Serving sizes of ready-to-eat cereals vary  Look at the serving size and the total carbohydrate amount listed on the food label  Each serving of food listed below contains about 15 g of carbohydrate   ? ¾ cup of dry, unsweetened, ready-to-eat cereal or ¼ cup of low-fat granola     ? ½ cup of oatmeal or other cooked cereal     ? ? cup of cooked rice or pasta    · Starchy vegetables and beans:  Each serving of food listed below contains about 15 g of carbohydrate   ? ½ cup of corn, green peas, sweet potatoes, or mashed potatoes    ? ¼ of a large baked potato    ? ½ cup of beans, lentils, and peas (garbanzo, hunt, kidney, white, split, black-eyed)    · Crackers and snacks:  Each serving of food listed below contains about 15 g of carbohydrate   ? 3 ava cracker squares or 8 animal crackers     ? 6 saltine-type crackers    ? 3 cups of popcorn or ¾ ounce of pretzels, potato chips, or tortilla chips    · Fruit:  Each serving of food listed below contains about 15 g of carbohydrate   ? 1 small (4 ounce) piece of fresh fruit or ¾ to 1 cup of fresh fruit    ? ½ cup of canned or frozen fruit, packed in natural juice    ?  ½ cup (4 ounces) of unsweetened fruit juice    ? 2 tablespoons of dried fruit    · Desserts or sugary foods:  Each serving of food listed below contains about 15 g of carbohydrate   ? 2-inch square unfrosted cake or brownie     ? 2 small cookies    ? ½ cup of ice cream, frozen yogurt, or nondairy frozen yogurt    ? ¼ cup of sherbet or sorbet    ? 1 tablespoon of regular syrup, jam, or jelly    ? 2 tablespoons of light syrup    · Milk and yogurt:  Foods from the milk group contain about 12 g of carbohydrate per serving  ? 1 cup of fat-free or low-fat milk    ? 1 cup of soy milk    ? ? cup of fat-free, yogurt sweetened with artificial sweetener    · Non-starchy vegetables:  Each serving contains about 5 g of carbohydrate   Three servings of non-starch vegetables count as 1 carbohydrate serving  ? ½ cup of cooked vegetables or 1 cup of raw vegetables  This includes beets, broccoli, cabbage, cauliflower, cucumber, mushrooms, tomatoes, and zucchini    ? ½ cup of vegetable juice    How to use carbohydrate counting to plan meals:   · Count carbohydrate amounts using serving sizes:      ? Pasta dinner example: You plan to have pasta, tossed salad, and an 8-ounce glass of milk  Your healthcare provider tells you that you may have 4 carbohydrate servings for dinner  One carbohydrate serving of pasta is ? cup  One cup of pasta will equal 3 carbohydrate servings  An 8-ounce glass of milk will count as 1 carbohydrate serving  These amounts of food would equal 4 carbohydrate servings  One cup of tossed salad does not count toward your carbohydrate servings  · Count carbohydrate amounts using food labels:  Find the total amount of carbohydrate in a packaged food by reading the food label  Food labels tell you the serving size of the food and the total carbohydrate amount in each serving  Find the serving size on the food label and then decide how many servings you will eat   Multiply the number of servings you plan to eat by the carbohydrate amount per serving  ? Granola bar snack example: Your meal plan allows you to have 2 carbohydrate servings (30 grams) of carbohydrate for a snack  You plan to eat 1 package of granola bars, which contains 2 bars  According to the food label, the serving size of food in this package is 1 bar  Each serving (1 bar) contains 25 grams of carbohydrate  The total amount of carbohydrate in this package of granola bars would be 50 g  Based on your meal plan, you should eat only 1 bar  Follow up with your doctor as directed:  Write down your questions so you remember to ask them during your visits  © Copyright [employer] 2022 Information is for End User's use only and may not be sold, redistributed or otherwise used for commercial purposes  All illustrations and images included in CareNotes® are the copyrighted property of A D A M , Inc  or Thomas Small   The above information is an  only  It is not intended as medical advice for individual conditions or treatments  Talk to your doctor, nurse or pharmacist before following any medical regimen to see if it is safe and effective for you  Foot Care for People with Diabetes   AMBULATORY CARE:   What you need to know about foot care:   · Foot care helps protect your feet and prevent foot ulcers or sores  Long-term high blood sugar levels can damage the blood vessels and nerves in your legs and feet  This damage makes it hard to feel pressure, pain, temperature, and touch  You may not be able to feel a cut or sore, or shoes that are too tight  Foot care is needed to prevent serious problems, such as an infection or amputation  · Diabetes may cause your toes to become crooked or curved under  These changes may affect the way you walk and can lead to increased pressure on your foot  The pressure can decrease blood flow to your feet  Lack of blood flow increases your risk for a foot ulcer   Do not ignore small problems, such as dry skin or small wounds  These can become life-threatening over time without proper care  Call your care team provider if:   · Your feet become numb, weak, or hard to move  · You have pus draining from a sore on your foot  · You have a wound on your foot that gets bigger, deeper, or does not heal      · You see blisters, cuts, scratches, calluses, or sores on your foot  · You have a fever, and your feet become red, warm, and swollen  · Your toenails become thick, curled, or yellow  · You find it hard to check your feet because your vision is poor  · You have questions or concerns about your condition or care  How to care for your feet:   · Check your feet each day  Look at your whole foot, including the bottom, and between and under your toes  Check for wounds, corns, and calluses  Use a mirror to see the bottom of your feet  The skin on your feet may be shiny, tight, or darker than normal  Your feet may also be cold and pale  Feel your feet by running your hands along the tops, bottoms, sides, and between your toes  Redness, swelling, and warmth are signs of blood flow problems that can lead to a foot ulcer  Do not try to remove corns or calluses yourself  · Wash your feet each day with soap and warm water  Do not use hot water, because this can injure your foot  Dry your feet gently with a towel after you wash them  Dry between and under your toes  · Apply lotion or a moisturizer on your dry feet  Ask your care team provider what lotions are best to use  Do not put lotion or moisturizer between your toes  Moisture between your toes could lead to skin breakdown  · Cut your toenails correctly  File or cut your toenails straight across  Use a soft brush to clean around your toenails  If your toenails are very thick, you may need to have a care team provider or specialist cut them  · Protect your feet  Do not walk barefoot or wear your shoes without socks   Check your shoes for rocks or other objects that can hurt your feet  Wear cotton socks to help keep your feet dry  Wear socks without toe seams, or wear them with the seams inside out  Change your socks each day  Do not wear socks that are dirty or damp  · Wear shoes that fit well  Wear shoes that do not rub against any area of your feet  Your shoes should be ½ to ¾ inch (1 to 2 centimeters) longer than your feet  Your shoes should also have extra space around the widest part of your feet  Walking or athletic shoes with laces or straps that adjust are best  Ask your care team provider for help to choose shoes that fit you best  Ask him or her if you need to wear an insert, orthotic, or bandage on your feet  · Go to your follow-up visits  Your care team provider will do a foot exam at least once a year  You may need a foot exam more often if you have nerve damage, foot deformities, or ulcers  He will check for nerve damage and how well you can feel your feet  He will check your shoes to see if they fit well  · Do not smoke  Smoking can damage your blood vessels and put you at increased risk for foot ulcers  Ask your care team provider for information if you currently smoke and need help to quit  E-cigarettes or smokeless tobacco still contain nicotine  Talk to your care team provider before you use these products  Follow up with your diabetes care team provider or foot specialist as directed: You will need to have your feet checked at least once a year  You may need a foot exam more often if you have nerve damage, foot deformities, or ulcers  Write down your questions so you remember to ask them during your visits  © Copyright [employer] 2022 Information is for End User's use only and may not be sold, redistributed or otherwise used for commercial purposes   All illustrations and images included in CareNotes® are the copyrighted property of [employer]  or Thomas Doran  The above information is an  only  It is not intended as medical advice for individual conditions or treatments  Talk to your doctor, nurse or pharmacist before following any medical regimen to see if it is safe and effective for you  What to Do if Your Blood Sugar is Low   AMBULATORY CARE:   Low blood sugar levels  (hypoglycemia) can happen with Type 1 and Type 2 diabetes  Low levels are more likely to happen if you use insulin  Hypoglycemia can cause you to have falls, accidents, and injuries  A blood sugar level that gets too low can lead to seizures, coma, and death  Learn to recognize the symptoms early so you can get treatment quickly  When your blood sugar is low you may feel:  · Sweaty    · Nervous or shaky    · Anxious or irritable    · Confused    · A fast, pounding heartbeat    · Extremely hungry    Have someone call your local emergency number (911 in the [address] [facility]) if:   · You cannot be woken  · You have a seizure  Call your doctor if:   · You have symptoms of a low blood sugar level, such as trouble thinking, sweating, or a pounding heartbeat  · Your blood sugar level is lower than normal and it does not improve with treatment  · You often have lower blood sugar levels than your target goals  · You have trouble coping with your illness, or you feel anxious or depressed  · You have questions or concerns about your condition or care  What to do if you have symptoms of low blood sugar:   · Check your blood sugar level, if possible  Your blood sugar level is too low if it is at or below 70 mg/dL  · Eat or drink 15 grams of fast-acting carbohydrate  Fast-acting carbohydrates will raise your blood sugar level quickly  Examples of 15 grams of fast-acting carbohydrates:     ? 4 ounces (½ cup) of fruit juice     ? 4 ounces of regular soda    ? 2 tablespoons of raisins     ? 1 tube of glucose gel or 3 to 4 glucose tablets       · Check your blood sugar level 15 minutes later    If the level is still low (less than 100 mg/dL), eat another 15 grams of carbohydrate  When the level returns to 100 mg/dL, eat a snack or meal that contains carbohydrates  This will help prevent another drop in blood sugar  · Teach people close to you how to use your glucagon kit  Your blood sugar may be too low for you to be awake  People need to know when and how to use your kit  Prevent low blood sugar levels:  Prevent low blood sugar by knowing what increases your risk  Ask your healthcare provider for ways to prevent low blood sugar levels  Any of the following can increase your risk of low blood sugar:  · Fasting for tests or procedures    · During or after intense exercise    · Late or postponed meals    · Sleeping (you may need a bedtime snack)     · Drinking alcohol if you use insulin or insulin releasing pills    Follow up with your doctor as directed:  Write down your questions so you remember to ask them during your visits  © [employer] 2022 Information is for End User's use only and may not be sold, redistributed or otherwise used for commercial purposes  All illustrations and images included in CareNotes® are the copyrighted property of A D A M [employer]  or [employer]  The above information is an  only  It is not intended as medical advice for individual conditions or treatments  Talk to your doctor, nurse or pharmacist before following any medical regimen to see if it is safe and effective for you  10% - bad control"> 10% - bad control,Hemoglobin A1c (HbA1c) greater than 10% indicating poor diabetic control,Haemoglobin A1c greater than 10% indicating poor diabetic control">   Diabetes Mellitus Type 2 in Adults, Ambulatory Care   GENERAL INFORMATION:   Diabetes mellitus type 2  is a disease that affects how your body uses glucose (sugar)  Insulin helps move sugar out of the blood so it can be used for energy   Normally, when the blood sugar level increases, the pancreas makes more insulin  Type 2 diabetes develops because either the body cannot make enough insulin, or it cannot use the insulin correctly  After many years, your pancreas may stop making insulin  Common symptoms include the following:   · More hunger or thirst than usual     · Frequent urination     · Weight loss without trying     · Blurred vision  Seek immediate care for the following symptoms:   · Severe abdominal pain, or pain that spreads to your back  You may also be vomiting  · Trouble staying awake or focusing    · Shaking or sweating    · Blurred or double vision    · Breath has a fruity, sweet smell    · Breathing is deep and labored, or rapid and shallow    · Heartbeat is fast and weak  Treatment for diabetes mellitus type 2  includes keeping your blood sugar at a normal level  You must eat the right foods, and exercise regularly  You may also need medicine if you cannot control your blood sugar level with nutrition and exercise  Manage diabetes mellitus type 2:   · Check your blood sugar level  You will be taught how to check a small drop of blood in a glucose monitor  Ask your healthcare provider when and how often to check during the day  Ask your healthcare provider what your blood sugar levels should be when you check them  · Keep track of carbohydrates (sugar and starchy foods)  Your blood sugar level can get too high if you eat too many carbohydrates  Your dietitian will help you plan meals and snacks that have the right amount of carbohydrates  · Eat low-fat foods  Some examples are skinless chicken and low-fat milk  · Eat less sodium (salt)  Some examples of high-sodium foods to limit are soy sauce, potato chips, and soup  Do not add salt to food you cook  Limit your use of table salt  · Eat high-fiber foods  Foods that are a good source of fiber include vegetables, whole grain bread, and beans  · Limit alcohol    Alcohol affects your blood sugar level and can make it harder to manage your diabetes  Women should limit alcohol to 1 drink a day  Men should limit alcohol to 2 drinks a day  A drink of alcohol is 12 ounces of beer, 5 ounces of wine, or 1½ ounces of liquor  · Get regular exercise  Exercise can help keep your blood sugar level steady, decrease your risk of heart disease, and help you lose weight  Exercise for at least 30 minutes, 5 days a week  Include muscle strengthening activities 2 days each week  Work with your healthcare provider to create an exercise plan  · Check your feet each day  for injuries or open sores  Ask your healthcare provider for activities you can do if you have an open sore  · Quit smoking  If you smoke, it is never too late to quit  Smoking can worsen the problems that may occur with diabetes  Ask your healthcare provider for information about how to stop smoking if you are having trouble quitting  · Ask about your weight:  Ask healthcare providers if you need to lose weight, and how much to lose  Ask them to help you with a weight loss program  Even a 10 to 15 pound weight loss can help you manage your blood sugar level  · Carry medical alert identification  Wear medical alert jewelry or carry a card that says you have diabetes  Ask your healthcare provider where to get these items  · Ask about vaccines  Diabetes puts you at risk of serious illness if you get the flu, pneumonia, or hepatitis  Ask your healthcare provider if you should get a flu, pneumonia, or hepatitis B vaccine, and when to get the vaccine  Follow up with your healthcare provider as directed:  Write down your questions so you remember to ask them during your visits  CARE AGREEMENT:   You have the right to help plan your care  Learn about your health condition and how it may be treated  Discuss treatment options with your caregivers to decide what care you want to receive  You always have the right to refuse treatment   The above information is an educational aid only  It is not intended as medical advice for individual conditions or treatments  Talk to your doctor, nurse or pharmacist before following any medical regimen to see if it is safe and effective for you  © 2014 [address] is for End User's use only and may not be sold, redistributed or otherwise used for commercial purposes  All illustrations and images included in CareNotes® are the copyrighted property of A D A M [employer]  or Marcus Montez  Type 2 Diabetes in Adults: New Diagnosis   AMBULATORY CARE:   Type 2 diabetes  is a disease that affects how your body uses glucose (sugar)  Normally, when the blood sugar level increases, the pancreas makes more insulin  Insulin helps move sugar out of the blood so it can be used for energy  Type 2 diabetes develops because either the body cannot make enough insulin, or it cannot use the insulin correctly  Type 2 diabetes can be controlled to prevent damage to your heart, blood vessels, and other organs  Common symptoms include the following:   · Numbness in your fingers or toes    · Blurred vision    · Frequent urination    · More hunger or thirst than usual    Have someone call your local emergency number (911 in the [address] [facility]) if:   · You have any of the following signs of a stroke:      ? Numbness or drooping on one side of your face     ? Weakness in an arm or leg    ? Confusion or difficulty speaking    ? Dizziness, a severe headache, or vision loss    · You have any of the following signs of a heart attack:      ? Squeezing, pressure, or pain in your chest    ? You may  also have any of the following:     § Discomfort or pain in your back, neck, jaw, stomach, or arm    § Shortness of breath    § Nausea or vomiting    § Lightheadedness or a sudden cold sweat    · You have trouble breathing  Seek care immediately if:   · You have severe abdominal pain  · You vomit for more than 2 hours      · You have trouble staying awake or focusing  · You are shaking or sweating  · You feel weak or more tired than usual     · You have blurred or double vision  · Your breath has a fruity, sweet smell  Call your doctor or diabetes care team if:   · Your arms and legs are swollen  · You have an upset stomach and cannot eat the foods on your meal plan  · You feel dizzy, have headaches, or are easily irritated  · Your skin is red, warm, dry, or swollen  · You have a wound that does not heal     · You have numbness in your arms or legs  · You have trouble coping with your illness, or you feel anxious or depressed  · You have questions or concerns about your condition or care  Treatment for type 2 diabetes  helps prevent or delay complications, including heart and kidney disease  You must eat healthy meals and do regular physical activity  You may  need any of the following:  · Diabetes medicines or insulin  may be given to decrease the amount of sugar in your blood  · Blood pressure medicine  may be given to lower your blood pressure  · Cholesterol-lowering medicine  may be given to prevent heart disease  · Antiplatelets , such as aspirin, help prevent blood clots  Take your antiplatelet medicine exactly as directed  These medicines make it more likely for you to bleed or bruise  If you are told to take aspirin, do not take acetaminophen or ibuprofen instead  · Take your medicine as directed  Contact your healthcare provider if you think your medicine is not helping or if you have side effects  Tell him or her if you are allergic to any medicine  Keep a list of the medicines, vitamins, and herbs you take  Include the amounts, and when and why you take them  Bring the list or the pill bottles to follow-up visits  Carry your medicine list with you in case of an emergency  Diabetes education:  Diabetes education will start right away  Diabetes education may also happen later to refresh your memory  Your diabetes care team may include physicians, nurse practitioners, and physician assistants  It may also include nurses, dietitians, exercise specialists, pharmacists, dentists, and podiatrists  Family members, or others who are close to you, may also be part of the team  You and your team will make goals and plans to manage diabetes and other health problems  The plans and goals will be specific to your needs  Members of your diabetes care team teach you the following:  · About nutrition:  A dietitian will help you make a meal plan to keep your blood sugar level steady  You will learn how food affects your blood sugar levels  You will also learn to keep track of sugar and starchy foods (carbohydrates)  Do not skip meals  Your blood sugar level may drop too low if you have taken diabetes medicine and do not eat  You may be taught to use the plate method for portion control  With the plate method, ½ of your plate contains vegetables  The other half is divided so that ¼ contains protein or meat, and ¼ contains starches, such as potatoes  · About physical activity and diabetes: You will learn why physical activity, such as walking, is important  You and your care team provider will make a plan for your activity  Your care team provider will tell you what a healthy weight is for you  He or she will help you make a plan to get to that weight and stay there  Maintain a healthy weight to help delay or prevent complications of diabetes  · About your blood sugar level: You will learn what your blood sugar level should be  You will be given information on when and how to check your blood sugar level  You may need to check by testing a drop of blood in a glucose monitor  You may instead be given a continuous glucose monitoring (CGM) device  A sensor is placed in your abdomen or on your arm  You put a transmitter on the sensor to get a reading that shows up on the monitor   You will learn what to do if your level is too high or too low  Write down the times of your checks and your levels  Take them to all follow-up appointments  · About diabetes medicine: You may need oral diabetes medicine, insulin, or both to help control your blood sugar levels  Your healthcare provider will teach you how and when to take oral diabetes medicine  You will also be taught about side effects oral diabetes medicine can cause  Insulin may be added if oral diabetes medicine becomes less effective over time  Insulin may be injected, or given through a pump or pen  You and your care team will discuss which method is best for you  ? An insulin pump  is an implanted device that gives your insulin 24 hours a day  An insulin pump prevents the need for multiple insulin injections in a day  ? An insulin pen  is a device prefilled with the right amount of insulin  ? You and your family members will be taught how to draw up and give insulin  if this is the best method for you  Your education team will also teach you how to dispose of needles and syringes  ? You will learn how much insulin you need  and when to give it  You will be taught when not to give insulin  You will also be taught what to do if your blood sugar level drops too low  This may happen if you take insulin and do not eat the right amount of carbohydrates  Other ways to help manage type 2 diabetes:   · Talk to your care team if you have increased stress about your diagnosis  When you feel stressed about your diagnosis, it can cause you not to take care of yourself properly  Your care team can help by offering tips about self-care  Your care team may suggest you talk to a mental health provider  The provider can listen and offer help with self-care issues  · Check your feet each day for sores  Wear shoes and socks that fit correctly  Do not trim your toenails  Go to a podiatrist  Ask your care team for more information about foot care  · Do not smoke  Nicotine and other chemicals in cigarettes and cigars can cause lung damage and make diabetes harder to manage  Ask your care team provider for information if you currently smoke and need help to quit  E-cigarettes or smokeless tobacco still contain nicotine  Talk to your care team provider before you use these products  · Drink water instead of sugary drinks such as soft drinks and fruit juices  Sugary drinks cause high blood sugar and cause an increase in your weight  Your healthcare provider may tell you that diet drinks do not help with weight loss  · Know the risks if you choose to drink alcohol  Alcohol can cause your blood sugar levels to be low if you use insulin  Alcohol can cause high blood sugar levels and weight gain if you drink too much  A drink of alcohol is 12 ounces of beer, 5 ounces of wine, or 1½ ounces of liquor  Your care team can tell you how many drinks are okay to have in 24 hours and in 1 week  · Check your blood pressure as directed  If you have high blood pressure (BP), talk to your care team about your BP goals  Together you can create a plan to lower your BP if needed and keep it in a healthy range  The plan may include lifestyle changes or medicines  A normal BP is 119/79 or lower  A normal blood pressure can help prevent or delay certain complications from diabetes  Examples include retinopathy (eye damage) and kidney damage  · Wear medical alert identification  Wear medical alert jewelry or carry a card that says you have diabetes  Ask your care team provider where to get these items  · Ask about vaccines you may need  You have a higher risk for serious illness if you get the flu, pneumonia, COVID-19, or hepatitis  Ask your provider if you should get a flu, pneumonia, or hepatitis B vaccine, and when to get the COVID-19 vaccine  Risks of type 2 diabetes: It is important to learn to keep your diabetes in control   Uncontrolled diabetes can damage your nerves, veins, and arteries  Your risk for dementia increases faster the longer your diabetes is not controlled  High blood sugar levels may damage other body tissues and organs over time  Damage to arteries may increase your risk for heart attack and stroke  Nerve damage may also lead to other heart, stomach, and nerve problems  Diabetes can become life-threatening if it is not controlled  Follow up with your care team providers as directed: You may need to return to have your A1c checked every 3 months  You will need to have your feet checked during at least 1 visit each year  You will need an eye exam 1 time each year to check for retinopathy  You will also need urine tests every year to check for kidney problems  You may need tests to monitor for heart disease, such as an EKG, stress test, blood pressure monitoring, and blood tests  Write down your questions so you remember to ask them during your visits  © [employer] 2022 Information is for End User's use only and may not be sold, redistributed or otherwise used for commercial purposes  All illustrations and images included in CareNotes® are the copyrighted property of [employer] D A M [employer] Inc  or [facility] [employer]   The above information is an  only  It is not intended as medical advice for individual conditions or treatments  Talk to your doctor, nurse or pharmacist before following any medical regimen to see if it is safe and effective for you  Basic Carbohydrate Counting   AMBULATORY CARE:   Carbohydrate counting  is a way to plan your meals by counting the amount of carbohydrate in foods  Carbohydrates are the sugars, starches, and fiber found in fruit, grains, vegetables, and milk products  Carbohydrates increase your blood sugar levels  Carbohydrate counting can help you eat the right amount of carbohydrate to keep your blood sugar levels under control     What you need to know about planning meals using carbohydrate counting:  · A dietitian or healthcare provider will help you develop a healthy meal plan that works best for you  You will be taught how much carbohydrate to eat or drink for each meal and snack  Your meal plan will be based on your age, weight, usual food intake, and physical activity level  If you have diabetes, it will also include your blood sugar levels and diabetes medicine  Once you know how much carbohydrate you should eat, you can decide what type of food you want to eat  · You will need to know what foods contain carbohydrate and how much they contain  Keep track of the amount of carbohydrate in meals and snacks in order to follow your meal plan  Do not avoid carbohydrates or skip meals  Your blood sugar may fall too low if you do not eat enough carbohydrate or you skip meals  Foods that contain carbohydrate:   · Breads:  Each serving of food listed below contains about 15 g of carbohydrate   ? 1 slice of bread (1 ounce) or 1 flour or corn tortilla (6 inch)    ? ½ of a hamburger bun or ¼ of a large bagel (about 1 ounce)    ? 1 pancake (about 4 inches across and ¼ inch thick)    · Cereals and grains:  Serving sizes of ready-to-eat cereals vary  Look at the serving size and the total carbohydrate amount listed on the food label  Each serving of food listed below contains about 15 g of carbohydrate   ? ¾ cup of dry, unsweetened, ready-to-eat cereal or ¼ cup of low-fat granola     ? ½ cup of oatmeal or other cooked cereal     ? ? cup of cooked rice or pasta    · Starchy vegetables and beans:  Each serving of food listed below contains about 15 g of carbohydrate   ? ½ cup of corn, green peas, sweet potatoes, or mashed potatoes    ? ¼ of a large baked potato    ? ½ cup of beans, lentils, and peas (garbanzo, hunt, kidney, white, split, black-eyed)    · Crackers and snacks:  Each serving of food listed below contains about 15 g of carbohydrate       ? 3 ava cracker squares or 8 animal crackers     ? 6 saltine-type crackers    ? 3 cups of popcorn or ¾ ounce of pretzels, potato chips, or tortilla chips    · Fruit:  Each serving of food listed below contains about 15 g of carbohydrate   ? 1 small (4 ounce) piece of fresh fruit or ¾ to 1 cup of fresh fruit    ? ½ cup of canned or frozen fruit, packed in natural juice    ? ½ cup (4 ounces) of unsweetened fruit juice    ? 2 tablespoons of dried fruit    · Desserts or sugary foods:  Each serving of food listed below contains about 15 g of carbohydrate   ? 2-inch square unfrosted cake or brownie     ? 2 small cookies    ? ½ cup of ice cream, frozen yogurt, or nondairy frozen yogurt    ? ¼ cup of sherbet or sorbet    ? 1 tablespoon of regular syrup, jam, or jelly    ? 2 tablespoons of light syrup    · Milk and yogurt:  Foods from the milk group contain about 12 g of carbohydrate per serving  ? 1 cup of fat-free or low-fat milk    ? 1 cup of soy milk    ? ? cup of fat-free, yogurt sweetened with artificial sweetener    · Non-starchy vegetables:  Each serving contains about 5 g of carbohydrate   Three servings of non-starch vegetables count as 1 carbohydrate serving  ? ½ cup of cooked vegetables or 1 cup of raw vegetables  This includes beets, broccoli, cabbage, cauliflower, cucumber, mushrooms, tomatoes, and zucchini    ? ½ cup of vegetable juice    How to use carbohydrate counting to plan meals:   · Count carbohydrate amounts using serving sizes:      ? Pasta dinner example: You plan to have pasta, tossed salad, and an 8-ounce glass of milk  Your healthcare provider tells you that you may have 4 carbohydrate servings for dinner  One carbohydrate serving of pasta is ? cup  One cup of pasta will equal 3 carbohydrate servings  An 8-ounce glass of milk will count as 1 carbohydrate serving  These amounts of food would equal 4 carbohydrate servings  One cup of tossed salad does not count toward your carbohydrate servings         · Count carbohydrate amounts using food labels:  Find the total amount of carbohydrate in a packaged food by reading the food label  Food labels tell you the serving size of the food and the total carbohydrate amount in each serving  Find the serving size on the food label and then decide how many servings you will eat  Multiply the number of servings you plan to eat by the carbohydrate amount per serving  ? Granola bar snack example: Your meal plan allows you to have 2 carbohydrate servings (30 grams) of carbohydrate for a snack  You plan to eat 1 package of granola bars, which contains 2 bars  According to the food label, the serving size of food in this package is 1 bar  Each serving (1 bar) contains 25 grams of carbohydrate  The total amount of carbohydrate in this package of granola bars would be 50 g  Based on your meal plan, you should eat only 1 bar  Follow up with your doctor as directed:  Write down your questions so you remember to ask them during your visits  © Copyright [employer] 2022 Information is for End User's use only and may not be sold, redistributed or otherwise used for commercial purposes  All illustrations and images included in CareNotes® are the copyrighted property of A D A M , Inc  or [facility] [employer] [facility]  The above information is an  only  It is not intended as medical advice for individual conditions or treatments  Talk to your doctor, nurse or pharmacist before following any medical regimen to see if it is safe and effective for you  Foot Care for People with Diabetes   AMBULATORY CARE:   What you need to know about foot care:   · Foot care helps protect your feet and prevent foot ulcers or sores  Long-term high blood sugar levels can damage the blood vessels and nerves in your legs and feet  This damage makes it hard to feel pressure, pain, temperature, and touch  You may not be able to feel a cut or sore, or shoes that are too tight   Foot care is needed to prevent serious problems, such as an infection or amputation  · Diabetes may cause your toes to become crooked or curved under  These changes may affect the way you walk and can lead to increased pressure on your foot  The pressure can decrease blood flow to your feet  Lack of blood flow increases your risk for a foot ulcer  Do not ignore small problems, such as dry skin or small wounds  These can become life-threatening over time without proper care  Call your care team provider if:   · Your feet become numb, weak, or hard to move  · You have pus draining from a sore on your foot  · You have a wound on your foot that gets bigger, deeper, or does not heal      · You see blisters, cuts, scratches, calluses, or sores on your foot  · You have a fever, and your feet become red, warm, and swollen  · Your toenails become thick, curled, or yellow  · You find it hard to check your feet because your vision is poor  · You have questions or concerns about your condition or care  How to care for your feet:   · Check your feet each day  Look at your whole foot, including the bottom, and between and under your toes  Check for wounds, corns, and calluses  Use a mirror to see the bottom of your feet  The skin on your feet may be shiny, tight, or darker than normal  Your feet may also be cold and pale  Feel your feet by running your hands along the tops, bottoms, sides, and between your toes  Redness, swelling, and warmth are signs of blood flow problems that can lead to a foot ulcer  Do not try to remove corns or calluses yourself  · Wash your feet each day with soap and warm water  Do not use hot water, because this can injure your foot  Dry your feet gently with a towel after you wash them  Dry between and under your toes  · Apply lotion or a moisturizer on your dry feet  Ask your care team provider what lotions are best to use  Do not put lotion or moisturizer between your toes  Moisture between your toes could lead to skin breakdown  · Cut your toenails correctly  File or cut your toenails straight across  Use a soft brush to clean around your toenails  If your toenails are very thick, you may need to have a care team provider or specialist cut them  · Protect your feet  Do not walk barefoot or wear your shoes without socks  Check your shoes for rocks or other objects that can hurt your feet  Wear cotton socks to help keep your feet dry  Wear socks without toe seams, or wear them with the seams inside out  Change your socks each day  Do not wear socks that are dirty or damp  · Wear shoes that fit well  Wear shoes that do not rub against any area of your feet  Your shoes should be ½ to ¾ inch (1 to 2 centimeters) longer than your feet  Your shoes should also have extra space around the widest part of your feet  Walking or athletic shoes with laces or straps that adjust are best  Ask your care team provider for help to choose shoes that fit you best  Ask him or her if you need to wear an insert, orthotic, or bandage on your feet  · Go to your follow-up visits  Your care team provider will do a foot exam at least once a year  You may need a foot exam more often if you have nerve damage, foot deformities, or ulcers  He will check for nerve damage and how well you can feel your feet  He will check your shoes to see if they fit well  · Do not smoke  Smoking can damage your blood vessels and put you at increased risk for foot ulcers  Ask your care team provider for information if you currently smoke and need help to quit  E-cigarettes or smokeless tobacco still contain nicotine  Talk to your care team provider before you use these products  Follow up with your diabetes care team provider or foot specialist as directed: You will need to have your feet checked at least once a year  You may need a foot exam more often if you have nerve damage, foot deformities, or ulcers  Write down your questions so you remember to ask them during your visits  © Copyright [employer] 2022 Information is for End User's use only and may not be sold, redistributed or otherwise used for commercial purposes  All illustrations and images included in CareNotes® are the copyrighted property of A [employer] A M [employer] Inc  or [employer]  The above information is an  only  It is not intended as medical advice for individual conditions or treatments  Talk to your doctor, nurse or pharmacist before following any medical regimen to see if it is safe and effective for you  What to Do if Your Blood Sugar is Low   AMBULATORY CARE:   Low blood sugar levels  (hypoglycemia) can happen with Type 1 and Type 2 diabetes  Low levels are more likely to happen if you use insulin  Hypoglycemia can cause you to have falls, accidents, and injuries  A blood sugar level that gets too low can lead to seizures, coma, and death  Learn to recognize the symptoms early so you can get treatment quickly  When your blood sugar is low you may feel:  · Sweaty    · Nervous or shaky    · Anxious or irritable    · Confused    · A fast, pounding heartbeat    · Extremely hungry    Have someone call your local emergency number (911 in the [address]) if:   · You cannot be woken  · You have a seizure  Call your doctor if:   · You have symptoms of a low blood sugar level, such as trouble thinking, sweating, or a pounding heartbeat  · Your blood sugar level is lower than normal and it does not improve with treatment  · You often have lower blood sugar levels than your target goals  · You have trouble coping with your illness, or you feel anxious or depressed  · You have questions or concerns about your condition or care  What to do if you have symptoms of low blood sugar:   · Check your blood sugar level, if possible  Your blood sugar level is too low if it is at or below 70 mg/dL       · Eat or drink 15 grams of fast-acting carbohydrate  Fast-acting carbohydrates will raise your blood sugar level quickly  Examples of 15 grams of fast-acting carbohydrates:     ? 4 ounces (½ cup) of fruit juice     ? 4 ounces of regular soda    ? 2 tablespoons of raisins     ? 1 tube of glucose gel or 3 to 4 glucose tablets       · Check your blood sugar level 15 minutes later  If the level is still low (less than 100 mg/dL), eat another 15 grams of carbohydrate  When the level returns to 100 mg/dL, eat a snack or meal that contains carbohydrates  This will help prevent another drop in blood sugar  · Teach people close to you how to use your glucagon kit  Your blood sugar may be too low for you to be awake  People need to know when and how to use your kit  Prevent low blood sugar levels:  Prevent low blood sugar by knowing what increases your risk  Ask your healthcare provider for ways to prevent low blood sugar levels  Any of the following can increase your risk of low blood sugar:  · Fasting for tests or procedures    · During or after intense exercise    · Late or postponed meals    · Sleeping (you may need a bedtime snack)     · Drinking alcohol if you use insulin or insulin releasing pills    Follow up with your doctor as directed:  Write down your questions so you remember to ask them during your visits  © Copyright [employer] 2022 Information is for End User's use only and may not be sold, redistributed or otherwise used for commercial purposes  All illustrations and images included in CareNotes® are the copyrighted property of [employer] M [employer]  or [employer]   The above information is an  only  It is not intended as medical advice for individual conditions or treatments  Talk to your doctor, nurse or pharmacist before following any medical regimen to see if it is safe and effective for you

## 2022-04-12 NOTE — PROGRESS NOTES
Assessment/Plan:     Diagnoses and all orders for this visit:    Type 2 diabetes mellitus without complication, without long-term current use of insulin ([facility] )  -     Ambulatory referral to Diabetic Education; Future  -     metFORMIN (GLUCOPHAGE-XR) 500 mg 24 hr tablet; Take 2 tablets (1,000 mg total) by mouth daily with breakfast  -     Ambulatory Referral to Endocrinology; Future  -     pioglitazone (ACTOS) 15 mg tablet; Take 1 tablet (15 mg total) by mouth daily     Newly diagnosed  A1c at 11 8% We will switch Metformin to the extended release to see if this helps with patient's GI issues  We will also add on Actos for glucose control  Patient's daughter encouraged to continue blood glucose monitoring and report any values >400  Diabetes education and endocrinology referrals placed  We will repeat A1c at next visit  Patient is encouraged to call our office for any questions/concerns, persistent or worsening symptoms  Patient states they understand and agree with treatment plan  Menopause  -     DXA bone density spine hip and pelvis; Future    Encounter for screening mammogram for malignant neoplasm of breast  -     Mammo screening bilateral w 3d & cad; Future    Screening for malignant neoplasm of colon  -     Ambulatory Referral to Gastroenterology; Future    Screening for lipid disorders  -     Lipid Panel with Direct LDL reflex; Future    Lipid panel able to be processed from previously collected lab specimen  Results showing Total cholesterol 263, Triglycerides 632, LDL unable to be calculated  ASCVD risk 26 3% today  Lipitor 40 mg ordered  Medication and s/e reviewed  Repeat lipid panel & CMP in 3 months  Obesity, morbid ([facility] )    We discussed appropriate lifestyle recommendations & goal BMI range  Pt was educated on appropriate diet and exercise modifications  Mixed hyperlipidemia      Total cholesterol 263, Triglycerides 632, LDL unable to be calculated  ASCVD risk 26 3% today   Lipitor 40 mg ordered  Medication and s/e reviewed  Repeat lipid panel & CMP in 3 months  Pt to f/u in 1 month or sooner PRN  Subjective:      Patient ID: Davina Alvares is a 71 y o  female  New patient presents today with her daughter for an ER follow up  She presented to [facility] on 04/09/22 "with her daughter for evaluation of altered mental status  The patient herself has no complaints  She states that she feels completely fine  The patient's daughter states that she has noticed that over the last few days, her mother has been "off"  She feels that she has been slower to respond and not acting like herself  Per the daughter, her mother lives with her at home and takes care of her child  The patient's daughter states that she ultimately made her mother come to the emergency department for evaluation because she is very concerned  The patient states that she was recently constipated, but that has since resolved  She denies fever, chills, chest pain, shortness of breath, abdominal pain, nausea, vomiting, headache, dizziness or lightheadedness "  In the ER patient was found to have elevated blood sugar and A1c at 11 8%  She also had CT head showing no acute abnormality  CXR negative  Urine dip positive for glucose  Trop negative  EKG showing NSR w/ RBB with no acute ST/T changes  She was offered inpatient admission, however patient refused and wanted to be discharged  She was given Metformin in ER and was sent home with a supply  Patient's daughter did buy glucometer and has been checking patient's sugars TID  Since d/c, patient has been complaining of abdominal upset and frequent diarrhea with the Metformin  She also was not set up with diabetes education or endocrinologist on ER discharge  Patient's daughter notes that the patient still has been "off" and labels it as "brain fog"    The daughter notes no facial drooping, weakness, slurred speech, but just notes she seems "off"       Patient's daughter notes that her mother has not been to her PCP in 8 years  Unfortunately, the patient's  passed away more than 8 years ago, and ever since there was a distrust against their shared PCP  She does note she has seen her Cardiologist virtually during the pandemic and has been on Amlodipine & Metoprolol for her blood pressure  No other diagnosed medical history to note by patient            The following portions of the patient's history were reviewed and updated as appropriate: allergies, current medications, past family history, past medical history, past social history, past surgical history and problem list     Review of Systems   Constitutional: Negative  Negative for chills and fatigue  HENT: Negative  Respiratory: Negative  Negative for cough and shortness of breath  Cardiovascular: Negative  Negative for chest pain  Gastrointestinal: Negative  Genitourinary: Negative  Musculoskeletal: Negative  Negative for myalgias  Neurological: Negative  Objective:      /80   Pulse 70   Resp 15   Ht 5' 3 75" (1 619 m)   Wt 88 5 kg (195 lb 1 6 oz)   SpO2 97%   BMI 33 75 kg/m²          Physical Exam  Vitals reviewed  Constitutional:       General: She is not in acute distress  Appearance: Normal appearance  She is not ill-appearing  HENT:      Head: Normocephalic  Right Ear: Tympanic membrane, ear canal and external ear normal       Left Ear: Tympanic membrane, ear canal and external ear normal    Eyes:      General: No visual field deficit  Neck:      Vascular: No carotid bruit  Cardiovascular:      Rate and Rhythm: Normal rate and regular rhythm  Pulses: Normal pulses  Heart sounds: Normal heart sounds  No murmur heard  Pulmonary:      Effort: Pulmonary effort is normal  No respiratory distress  Breath sounds: Normal breath sounds  No wheezing  Abdominal:      General: There is no distension  Palpations: Abdomen is soft  There is no mass  Tenderness: There is no abdominal tenderness  There is no guarding or rebound  Hernia: No hernia is present  Musculoskeletal:         General: Normal range of motion  Comments: karen 5/5 hand grasp   Skin:     General: Skin is warm and dry  Neurological:      Mental Status: She is alert and oriented to person, place, and time  Mental status is at baseline  GCS: GCS eye subscore is 4  GCS verbal subscore is 5  GCS motor subscore is 6  Cranial Nerves: Cranial nerves are intact  No dysarthria or facial asymmetry  Sensory: Sensation is intact  Motor: Motor function is intact  No weakness  Coordination: Coordination is intact  Psychiatric:         Mood and Affect: Mood normal          Behavior: Behavior normal          Thought Content:  Thought content normal          Judgment: Judgment normal

## 2022-04-13 ENCOUNTER — OFFICE VISIT (OUTPATIENT)
Dept: CARDIOLOGY CLINIC | Facility: CLINIC | Age: 72
End: 2022-04-13
Payer: MEDICARE

## 2022-04-13 VITALS
DIASTOLIC BLOOD PRESSURE: 90 MMHG | SYSTOLIC BLOOD PRESSURE: 158 MMHG | HEIGHT: 64 IN | WEIGHT: 196 LBS | BODY MASS INDEX: 33.46 KG/M2 | HEART RATE: 66 BPM

## 2022-04-13 DIAGNOSIS — I10 BENIGN ESSENTIAL HTN: ICD-10-CM

## 2022-04-13 DIAGNOSIS — E11.9 TYPE 2 DIABETES MELLITUS WITHOUT COMPLICATION, WITHOUT LONG-TERM CURRENT USE OF INSULIN (HCC): ICD-10-CM

## 2022-04-13 DIAGNOSIS — I10 ESSENTIAL HYPERTENSION: ICD-10-CM

## 2022-04-13 DIAGNOSIS — E78.1 HYPERTRIGLYCERIDEMIA: Primary | ICD-10-CM

## 2022-04-13 PROCEDURE — 99214 OFFICE O/P EST MOD 30 MIN: CPT | Performed by: INTERNAL MEDICINE

## 2022-04-13 RX ORDER — LISINOPRIL 10 MG/1
10 TABLET ORAL DAILY
Qty: 90 TABLET | Refills: 5 | Status: SHIPPED | OUTPATIENT
Start: 2022-04-13

## 2022-04-13 RX ORDER — GEMFIBROZIL 600 MG/1
600 TABLET, FILM COATED ORAL
Qty: 180 TABLET | Refills: 5 | Status: SHIPPED | OUTPATIENT
Start: 2022-04-13

## 2022-04-13 NOTE — PATIENT INSTRUCTIONS
Stop metoprolol  Stop atorvastatin (lipitor)  Start lisinopril 10 mg once daily  Start gemfibrozil  (Lopid) Twice daily    Blood test 7-10 days

## 2022-04-13 NOTE — ASSESSMENT & PLAN NOTE
Lab Results   Component Value Date    HGBA1C 11 8 (H) 04/09/2022   Will be seeing endocrine shortly

## 2022-04-13 NOTE — PROGRESS NOTES
Patient ID: Carole Forbes is a 71 y o  female  Plan:      Type 2 diabetes mellitus without complication, without long-term current use of insulin ([facility] )    Lab Results   Component Value Date    HGBA1C 11 8 (H) 04/09/2022   Will be seeing endocrine shortly  Benign essential HTN  Will switch metoprolol to lisinopril given DM    Hypertriglyceridemia  Triglycerides quite high  She would be best off on gemfibrozil all when compared to atorvastatin  Follow up Plan/Other summary comments: The above issues will be followed up further with her primary care physician and Endocrinology  To summarize the changes for now are stopping metoprolol and stopping atorvastatin but starting lisinopril and gemfibrozil  BMP in 7-10 days  HPI:  Patient is seen in follow-up today  She was in the emergency room earlier this week because of confusion  Her sugar was quite high as was the blood pressure  I have seen her in the past for hypertension and I am asked to comment further  Patient has moved to [facility] and will be having further follow-up in that region  She now has a primary care physician as of yesterday  No recent chest pain or chest pressure  No shortness of breath  No syncope or near syncope  I note triglyceride value was over 600 and blood pressure was 210 in the emergency room  Thus though the blood pressure is a little bit high today not nearly as high as several days ago              Most recent or relevant cardiac/vascular testing:    TTE 2012:  Normal       Past Surgical History:   Procedure Laterality Date    ABDOMINAL SURGERY       CMP:   Lab Results   Component Value Date    K 3 8 04/09/2022    CL 99 (L) 04/09/2022    CO2 28 04/09/2022    BUN 16 04/09/2022    CREATININE 1 00 04/09/2022    EGFR 56 04/09/2022       Lipid Profile:   Lab Results   Component Value Date    TRIG 632 (H) 04/09/2022    HDL 35 (L) 04/09/2022         Review of Systems   10  point ROS  was otherwise non pertinent or negative except as per HPI or as below  Gait: Normal         Objective:     /90   Pulse 66   Ht 5' 3 75" (1 619 m)   Wt 88 9 kg (196 lb)   BMI 33 91 kg/m²     PHYSICAL EXAM:    General:  Normal appearance in no distress  Eyes:  Anicteric  Oral mucosa:  Moist   Neck:  No JVD  Carotid upstrokes are brisk without bruits  No masses  Chest:  Clear to auscultation  Cardiac:  No palpable PMI  Normal S1 and S2  No murmur gallop or rub  Abdomen:  Soft and nontender  No palpable organomegaly or aortic enlargement  Extremities:  No peripheral edema  Musculoskeletal:  Symmetric  Vascular:  Femoral pulses are brisk without bruits  Popliteal pulses are intact bilaterally  Pedal pulses are intact  Neuro:  Grossly symmetric  Psych:  Alert and oriented x3          Current Outpatient Medications:     amLODIPine (NORVASC) 5 mg tablet, TAKE ONE TABLET BY MOUTH EVERY DAY, Disp: 90 tablet, Rfl: 5    metFORMIN (GLUCOPHAGE-XR) 500 mg 24 hr tablet, Take 2 tablets (1,000 mg total) by mouth daily with breakfast, Disp: 180 tablet, Rfl: 1    pioglitazone (ACTOS) 15 mg tablet, Take 1 tablet (15 mg total) by mouth daily, Disp: 30 tablet, Rfl: 2    gemfibrozil (LOPID) 600 mg tablet, Take 1 tablet (600 mg total) by mouth 2 (two) times a day before meals, Disp: 180 tablet, Rfl: 5    lisinopril (ZESTRIL) 10 mg tablet, Take 1 tablet (10 mg total) by mouth daily, Disp: 90 tablet, Rfl: 5  No Known Allergies  Past Medical History:   Diagnosis Date    Diabetes mellitus ([facility] )     Hypertension            Social History     Tobacco Use   Smoking Status Never Smoker   Smokeless Tobacco Never Used

## 2022-04-14 LAB
ATRIAL RATE: 60 BPM
P AXIS: 46 DEGREES
PR INTERVAL: 164 MS
QRS AXIS: -15 DEGREES
QRSD INTERVAL: 138 MS
QT INTERVAL: 488 MS
QTC INTERVAL: 488 MS
T WAVE AXIS: 6 DEGREES
VENTRICULAR RATE: 60 BPM

## 2022-04-14 PROCEDURE — 93010 ELECTROCARDIOGRAM REPORT: CPT | Performed by: INTERNAL MEDICINE

## 2022-04-28 ENCOUNTER — APPOINTMENT (OUTPATIENT)
Dept: LAB | Facility: HOSPITAL | Age: 72
End: 2022-04-28
Payer: MEDICARE

## 2022-04-28 DIAGNOSIS — I10 ESSENTIAL HYPERTENSION: ICD-10-CM

## 2022-04-28 LAB
ANION GAP SERPL CALCULATED.3IONS-SCNC: 7 MMOL/L (ref 4–13)
BUN SERPL-MCNC: 26 MG/DL (ref 5–25)
CALCIUM SERPL-MCNC: 10.5 MG/DL (ref 8.3–10.1)
CHLORIDE SERPL-SCNC: 105 MMOL/L (ref 100–108)
CO2 SERPL-SCNC: 26 MMOL/L (ref 21–32)
CREAT SERPL-MCNC: 1.1 MG/DL (ref 0.6–1.3)
GFR SERPL CREATININE-BSD FRML MDRD: 50 ML/MIN/1.73SQ M
GLUCOSE P FAST SERPL-MCNC: 120 MG/DL (ref 65–99)
POTASSIUM SERPL-SCNC: 4 MMOL/L (ref 3.5–5.3)
SODIUM SERPL-SCNC: 138 MMOL/L (ref 136–145)

## 2022-04-28 PROCEDURE — 36415 COLL VENOUS BLD VENIPUNCTURE: CPT

## 2022-04-28 PROCEDURE — 80048 BASIC METABOLIC PNL TOTAL CA: CPT

## 2022-05-09 ENCOUNTER — RA CDI HCC (OUTPATIENT)
Dept: OTHER | Facility: HOSPITAL | Age: 72
End: 2022-05-09

## 2022-05-09 NOTE — PROGRESS NOTES
[address]  coding opportunities     [address] [id number]     Chart Reviewed number of suggestions sent to Provider: 1     Patients Insurance     Medicare Insurance: [employer]

## 2022-05-12 ENCOUNTER — OFFICE VISIT (OUTPATIENT)
Dept: FAMILY MEDICINE CLINIC | Facility: CLINIC | Age: 72
End: 2022-05-12
Payer: MEDICARE

## 2022-05-12 VITALS
HEIGHT: 64 IN | SYSTOLIC BLOOD PRESSURE: 140 MMHG | HEART RATE: 81 BPM | RESPIRATION RATE: 16 BRPM | WEIGHT: 191.2 LBS | DIASTOLIC BLOOD PRESSURE: 82 MMHG | BODY MASS INDEX: 32.64 KG/M2

## 2022-05-12 DIAGNOSIS — Z12.12 SCREENING FOR COLORECTAL CANCER: ICD-10-CM

## 2022-05-12 DIAGNOSIS — E11.65 TYPE 2 DIABETES MELLITUS WITH HYPERGLYCEMIA, WITHOUT LONG-TERM CURRENT USE OF INSULIN (HCC): ICD-10-CM

## 2022-05-12 DIAGNOSIS — E83.52 HYPERCALCEMIA: ICD-10-CM

## 2022-05-12 DIAGNOSIS — I10 BENIGN ESSENTIAL HTN: ICD-10-CM

## 2022-05-12 DIAGNOSIS — Z12.11 SCREENING FOR COLORECTAL CANCER: ICD-10-CM

## 2022-05-12 DIAGNOSIS — E78.1 HYPERTRIGLYCERIDEMIA: ICD-10-CM

## 2022-05-12 DIAGNOSIS — Z12.31 ENCOUNTER FOR SCREENING MAMMOGRAM FOR MALIGNANT NEOPLASM OF BREAST: ICD-10-CM

## 2022-05-12 DIAGNOSIS — E66.01 OBESITY, MORBID (HCC): ICD-10-CM

## 2022-05-12 DIAGNOSIS — Z00.00 MEDICARE ANNUAL WELLNESS VISIT, SUBSEQUENT: Primary | ICD-10-CM

## 2022-05-12 DIAGNOSIS — Z12.11 COLON CANCER SCREENING: ICD-10-CM

## 2022-05-12 PROCEDURE — 99214 OFFICE O/P EST MOD 30 MIN: CPT | Performed by: NURSE PRACTITIONER

## 2022-05-12 PROCEDURE — G0439 PPPS, SUBSEQ VISIT: HCPCS | Performed by: NURSE PRACTITIONER

## 2022-05-12 PROCEDURE — 1123F ACP DISCUSS/DSCN MKR DOCD: CPT | Performed by: NURSE PRACTITIONER

## 2022-05-12 RX ORDER — FLASH GLUCOSE SENSOR
1 KIT MISCELLANEOUS
Qty: 6 EACH | Refills: 3 | Status: SHIPPED | OUTPATIENT
Start: 2022-05-12

## 2022-05-12 RX ORDER — FLASH GLUCOSE SCANNING READER
1 EACH MISCELLANEOUS DAILY
Qty: 1 EACH | Refills: 0 | Status: SHIPPED | OUTPATIENT
Start: 2022-05-12

## 2022-05-12 NOTE — PROGRESS NOTES
BMI Counseling: Body mass index is 33 08 kg/m²  The BMI is above normal  Nutrition recommendations include reducing portion sizes, decreasing overall calorie intake and 3-5 servings of fruits/vegetables daily  Exercise recommendations include moderate aerobic physical activity for 150 minutes/week  Assessment and Plan:  1  Prevnar 20 reviewed with patient  We do not have vaccines today, however, at her next visit in 3 months, we can give her this  She was also advised Shingrix is given at the pharmacy if she is interested  2  Cologuard order placed  3  Mammo ordered  4  Dexa ordered previously  4  F/u in 3 months for recheck or sooner PRN  Problem List Items Addressed This Visit        Endocrine    Type 2 diabetes mellitus with hyperglycemia, without long-term current use of insulin (HCC)    Relevant Medications    Continuous Blood Gluc  (FreeStyle Franco 2 [place]) KIRSTIE    Continuous Blood Gluc Sensor (FreeStyle Franco 2 Sensor) MISC       Cardiovascular and Mediastinum    Benign essential HTN       Other    Obesity, morbid ([address] )    Hypertriglyceridemia      Other Visit Diagnoses     Medicare annual wellness visit, subsequent    -  Primary    BMI 33 0-33 9,adult        Screening for colorectal cancer        Hypercalcemia        Relevant Orders    Basic metabolic panel    PTH, intact    Colon cancer screening        Relevant Orders    Cologuard    Encounter for screening mammogram for malignant neoplasm of breast        Relevant Orders    Mammo screening bilateral w 3d & cad           Preventive health issues were discussed with patient, and age appropriate screening tests were ordered as noted in patient's After Visit Summary  Personalized health advice and appropriate referrals for health education or preventive services given if needed, as noted in patient's After Visit Summary       History of Present Illness:     Patient presents for Medicare Annual Wellness visit    Patient Care Team:  Peggy Heading as PCP - General (Family Medicine)  Shean Chris DO as PCP - Endocrinology (Endocrinology)     Problem List:     Patient Active Problem List   Diagnosis    Benign essential HTN    SDH [facility]subdural hematoma) ([facility] )    Closed fracture of left proximal humerus    Obesity, morbid ([facility] )    Hypertriglyceridemia    Type 2 diabetes mellitus with hyperglycemia, without long-term current use of insulin ([facility] )      Past Medical and Surgical History:     Past Medical History:   Diagnosis Date    Diabetes mellitus ([facility] )     Hypertension      Past Surgical History:   Procedure Laterality Date    ABDOMINAL SURGERY        Family History:     Family History   Problem Relation Age of Onset    Alcohol abuse Neg Hx     Substance Abuse Neg Hx     Mental illness Neg Hx       Social History:     Social History     Socioeconomic History    Marital status:      Spouse name: None    Number of children: None    Years of education: None    Highest education level: None   Occupational History    None   Tobacco Use    Smoking status: Never Smoker    Smokeless tobacco: Never Used   Vaping Use    Vaping Use: Never used   Substance and Sexual Activity    Alcohol use:  Yes     Alcohol/week: 1 0 standard drink     Types: 1 Glasses of wine per week     Comment: 1 glass of wine weekly    Drug use: Never    Sexual activity: None   Other Topics Concern    None   Social History Narrative    None     Social Determinants of Health     Financial Resource Strain: Not on file   Food Insecurity: Not on file   Transportation Needs: Not on file   Physical Activity: Not on file   Stress: Not on file   Social Connections: Not on file   Intimate Partner Violence: Not on file   Housing Stability: Not on file      Medications and Allergies:     Current Outpatient Medications   Medication Sig Dispense Refill    amLODIPine (NORVASC) 5 mg tablet TAKE ONE TABLET BY MOUTH EVERY DAY 90 tablet 5    Continuous Blood Gluc  (FreeStyle [place] 2 [place]) KIRSTIE Use 1 Device in the morning 1 each 0    Continuous Blood Gluc Sensor (FreeStyle Franco 2 Sensor) MISC Use 1 application every 14 (fourteen) days 6 each 3    gemfibrozil (LOPID) 600 mg tablet Take 1 tablet (600 mg total) by mouth 2 (two) times a day before meals 180 tablet 5    lisinopril (ZESTRIL) 10 mg tablet Take 1 tablet (10 mg total) by mouth daily 90 tablet 5    metFORMIN (GLUCOPHAGE-XR) 500 mg 24 hr tablet Take 2 tablets (1,000 mg total) by mouth daily with breakfast 180 tablet 1    pioglitazone (ACTOS) 15 mg tablet Take 1 tablet (15 mg total) by mouth daily 30 tablet 2     No current facility-administered medications for this visit  Allergies   Allergen Reactions    Penicillins Other (See Comments)     Unknown       Immunizations:     Immunization History   Administered Date(s) Administered    COVID-19 MODERNA VACC 0 5 ML IM 08/24/2021, 09/21/2021    Tdap 04/14/2021      Health Maintenance:         Topic Date Due    DXA SCAN  Never done    Breast Cancer Screening: Mammogram  Never done    Colorectal Cancer Screening  Never done    Hepatitis C Screening  05/12/2023 (Originally 1950)         Topic Date Due    Pneumococcal Vaccine: 65+ Years (1 - PCV) Never done    COVID-19 Vaccine (3 - Booster for Moderna series) 02/21/2022      Medicare Health Risk Assessment:     /82   Pulse 81   Resp 16   Ht 5' 3 75" (1 619 m)   Wt 86 7 kg (191 lb 3 2 oz)   BMI 33 08 kg/m²      Molly Richards is here for her Subsequent Wellness visit  Health Risk Assessment:   Patient rates overall health as good  Patient feels that their physical health rating is same  Patient is very satisfied with their life  Eyesight was rated as same  Hearing was rated as same  Patient feels that their emotional and mental health rating is same  Patients states they are never, rarely angry  Patient states they are sometimes unusually tired/fatigued   Pain experienced in the last 7 days has been none  Patient states that she has experienced no weight loss or gain in last 6 months  Depression Screening:   PHQ-2 Score: 0      Fall Risk Screening: In the past year, patient has experienced: no history of falling in past year      Urinary Incontinence Screening:   Patient has not leaked urine accidently in the last six months  Home Safety:  Patient does not have trouble with stairs inside or outside of their home  Patient has working smoke alarms and has working carbon monoxide detector  Home safety hazards include: none  Nutrition:   Current diet is Regular and Limited junk food  Medications:   Patient is currently taking over-the-counter supplements  OTC medications include: see medication list  Patient is able to manage medications  Activities of Daily Living (ADLs)/Instrumental Activities of Daily Living (IADLs):   Walk and transfer into and out of bed and chair?: Yes  Dress and groom yourself?: Yes    Bathe or shower yourself?: Yes    Feed yourself? Yes  Do your laundry/housekeeping?: Yes  Manage your money, pay your bills and track your expenses?: Yes  Make your own meals?: Yes    Do your own shopping?: Yes    Previous Hospitalizations:   Any hospitalizations or ED visits within the last 12 months?: Yes    How many hospitalizations have you had in the last year?: 1-2    Hospitalization Comments: ER visit for HTN  BP has since been stable  She did have a f/u appt w/ her cardiologist as well      Advance Care Planning:   Living will: No      PREVENTIVE SCREENINGS      Cardiovascular Screening:    General: History Lipid Disorder, Screening Current and Screening Not Indicated      Diabetes Screening:     General: Screening Not Indicated, History Diabetes and Screening Current      Colorectal Cancer Screening:     General: Risks and Benefits Discussed    Due for: Cologuard      Breast Cancer Screening:     General: Risks and Benefits Discussed    Due for: Mammogram Cervical Cancer Screening:    General: Screening Not Indicated      Osteoporosis Screening:      Due for: DXA Axial      Abdominal Aortic Aneurysm (AAA) Screening:        General: Screening Not Indicated      Lung Cancer Screening:     General: Screening Not Indicated      Hepatitis C Screening:    General: Screening Current    Screening, Brief Intervention, and Referral to Treatment (SBIRT)    Screening  Typical number of drinks in a day: 0  Typical number of drinks in a week: 0  Interpretation: Low risk drinking behavior  Single Item Drug Screening:  How often have you used an illegal drug (including marijuana) or a prescription medication for non-medical reasons in the past year? never    Single Item Drug Screen Score: 0  Interpretation: Negative screen for possible drug use disorder    Other Counseling Topics:   Car/seat belt/driving safety and skin self-exam      Patient's shoes and socks removed  Right Foot/Ankle   Right Foot Inspection  Skin Exam: skin normal and skin intact  No dry skin, no warmth, no callus, no erythema, no maceration, no abnormal color, no pre-ulcer, no ulcer and no callus  Toe Exam: ROM and strength within normal limits  Sensory   Vibration: diminished  Proprioception: intact  Monofilament testing: intact    Vascular  Capillary refills: < 3 seconds  The right DP pulse is 2+  The right PT pulse is 2+  Right Toe  - Comprehensive Exam  Ecchymosis: none  Arch: normal  Hammertoes: absent  Claw Toes: absent  Swelling: none   Tenderness: none         Left Foot/Ankle  Left Foot Inspection  Skin Exam: skin normal and skin intact  No dry skin, no warmth, no erythema, no maceration, normal color, no pre-ulcer, no ulcer and no callus  Toe Exam: ROM and strength within normal limits  Sensory   Vibration: diminished  Proprioception: intact  Monofilament testing: intact    Vascular  Capillary refills: < 3 seconds  The left DP pulse is 2+  The left PT pulse is 2+       Left Toe  - Comprehensive Exam  Ecchymosis: none  Arch: normal  Hammertoes: absent  Claw toes: absent  Swelling: none   Tenderness: none             KARLA Faulkner

## 2022-05-12 NOTE — PROGRESS NOTES
Assessment/Plan:     Diagnoses and all orders for this visit:    BMI 33 0-33 9,adult    We discussed appropriate lifestyle recommendations & goal BMI range  Pt was educated on appropriate diet and exercise modifications  Screening for colorectal cancer    Cologuard ordered  Hypercalcemia  -     Basic metabolic panel; Future  -     PTH, intact; Future    Recent BMP showing Calcium 10 5  Repeat BMP along with PTH  Will contact patient w/ results once available  Type 2 diabetes mellitus with hyperglycemia, without long-term current use of insulin (HCC)  -     Continuous Blood Gluc  (FreeStyle Franco 2 [place]) KIRSTIE; Use 1 Device in the morning  -     Continuous Blood Gluc Sensor (FreeStyle Franco 2 Sensor) MISC; Use 1 application every 14 (fourteen) days    FreeStyle Franco ordered as she is checking sugars QID  Patient to continue on Metformin and Actos  Diabetes education appt set for 05/18  Per daughter report, blood glucose <100 prior to meals and rarely is ever above 150  This is a great improvement  Endo appt  Set for 05/18  Encounter for screening mammogram for malignant neoplasm of breast  -     Mammo screening bilateral w 3d & cad; Future    Benign essential HTN    BP stable at today's visit  Continue Norvasc & Lisinopril daily  Obesity, morbid ([address] )    We discussed appropriate lifestyle recommendations & goal BMI range  Pt was educated on appropriate diet and exercise modifications  Hypertriglyceridemia      Patient currently on Gemfibrozil BID  Recheck lipid panel in July 2022  Pt to RTO in 3 months for recheck or sooner PRN  Subjective:      Patient ID: Ilan Christian is a 71 y o  female  Patient presents today along with her daughter for a follow up as well as her AWV  Since our last visit, she has swtiched to Metformin ER and has noted significant improvement in GI side effects  Patient also admits her sugars at home have been 100-150    Patient notes overall she feels better  She does have an appointment w/ diabetes education on 05/18 as well as Endocrinology later next month  The following portions of the patient's history were reviewed and updated as appropriate: allergies, current medications, past family history, past medical history, past social history, past surgical history and problem list     Review of Systems   Constitutional: Negative  Negative for chills and fatigue  HENT: Negative  Respiratory: Negative  Negative for cough and shortness of breath  Cardiovascular: Negative  Negative for chest pain  Gastrointestinal: Negative  Genitourinary: Negative  Musculoskeletal: Negative  Negative for myalgias  Neurological: Negative  Objective:      /82   Pulse 81   Resp 16   Ht 5' 3 75" (1 619 m)   Wt 86 7 kg (191 lb 3 2 oz)   BMI 33 08 kg/m²          Physical Exam  Vitals reviewed  Constitutional:       Appearance: Normal appearance  HENT:      Head: Normocephalic  Cardiovascular:      Rate and Rhythm: Normal rate and regular rhythm  Pulses: Normal pulses  Heart sounds: Normal heart sounds  Pulmonary:      Effort: Pulmonary effort is normal       Breath sounds: Normal breath sounds  Abdominal:      General: Bowel sounds are normal       Palpations: Abdomen is soft  Musculoskeletal:         General: Normal range of motion  Skin:     General: Skin is warm and dry  Neurological:      Mental Status: She is alert and oriented to person, place, and time  Mental status is at baseline     Psychiatric:         Mood and Affect: Mood normal          Behavior: Behavior normal

## 2022-05-12 NOTE — PATIENT INSTRUCTIONS
Medicare Preventive Visit Patient Instructions  Thank you for completing your Welcome to Medicare Visit or Medicare Annual Wellness Visit today  Your next wellness visit will be due in one year (5/13/2023)  The screening/preventive services that you may require over the next 5-10 years are detailed below  Some tests may not apply to you based off risk factors and/or age  Screening tests ordered at today's visit but not completed yet may show as past due  Also, please note that scanned in results may not display below  Preventive Screenings:  Service Recommendations Previous Testing/Comments   Colorectal Cancer Screening  * Colonoscopy    * Fecal Occult Blood Test (FOBT)/Fecal Immunochemical Test (FIT)  * Fecal DNA/Cologuard Test  * Flexible Sigmoidoscopy Age: 54-65 years old   Colonoscopy: every 10 years (may be performed more frequently if at higher risk)  OR  FOBT/FIT: every 1 year  OR  Cologuard: every 3 years  OR  Sigmoidoscopy: every 5 years  Screening may be recommended earlier than age 48 if at higher risk for colorectal cancer  Also, an individualized decision between you and your healthcare provider will decide whether screening between the ages of 74-80 would be appropriate  Colonoscopy: Not on file  FOBT/FIT: Not on file  Cologuard: Not on file  Sigmoidoscopy: Not on file          Breast Cancer Screening Age: 36 years old  Frequency: every 1-2 years  Not required if history of left and right mastectomy Mammogram: Not on file        Cervical Cancer Screening Between the ages of 21-29, pap smear recommended once every 3 years  Between the ages of 33-67, can perform pap smear with HPV co-testing every 5 years     Recommendations may differ for women with a history of total hysterectomy, cervical cancer, or abnormal pap smears in past  Pap Smear: Not on file    Screening Not Indicated   Hepatitis C Screening Once for adults born between [facility]  More frequently in patients at high risk for Hepatitis C Hep C Antibody: Not on file    Screening Current   Diabetes Screening 1-2 times per year if you're at risk for diabetes or have pre-diabetes Fasting glucose: 120 mg/dL   A1C: 11 8 %    Screening Not Indicated  History Diabetes   Cholesterol Screening Once every 5 years if you don't have a lipid disorder  May order more often based on risk factors  Lipid panel: 04/09/2022    Screening Not Indicated  History Lipid Disorder     Other Preventive Screenings Covered by Medicare:  1  Abdominal Aortic Aneurysm (AAA) Screening: covered once if your at risk  You're considered to be at risk if you have a family history of AAA  2  Lung Cancer Screening: covers low dose CT scan once per year if you meet all of the following conditions: (1) Age 50-69; (2) No signs or symptoms of lung cancer; (3) Current smoker or have quit smoking within the last 15 years; (4) You have a tobacco smoking history of at least 30 pack years (packs per day multiplied by number of years you smoked); (5) You get a written order from a healthcare provider  3  Glaucoma Screening: covered annually if you're considered high risk: (1) You have diabetes OR (2) Family history of glaucoma OR (3)  aged 48 and older OR (3)  American aged 72 and older  3  Osteoporosis Screening: covered every 2 years if you meet one of the following conditions: (1) You're estrogen deficient and at risk for osteoporosis based off medical history and other findings; (2) Have a vertebral abnormality; (3) On glucocorticoid therapy for more than 3 months; (4) Have primary hyperparathyroidism; (5) On osteoporosis medications and need to assess response to drug therapy  · Last bone density test (DXA Scan): Not on file  5  HIV Screening: covered annually if you're between the age of 12-76  Also covered annually if you are younger than 13 and older than 72 with risk factors for HIV infection   For pregnant patients, it is covered up to 3 times per pregnancy  Immunizations:  Immunization Recommendations   Influenza Vaccine Annual influenza vaccination during flu season is recommended for all persons aged >= 6 months who do not have contraindications   Pneumococcal Vaccine (Prevnar and Pneumovax)  * Prevnar = PCV13  * Pneumovax = PPSV23   Adults 25-60 years old: 1-3 doses may be recommended based on certain risk factors  Adults 72 years old: Prevnar (PCV13) vaccine recommended followed by Pneumovax (PPSV23) vaccine  If already received PPSV23 since turning 65, then PCV13 recommended at least one year after PPSV23 dose  Hepatitis B Vaccine 3 dose series if at intermediate or high risk (ex: diabetes, end stage renal disease, liver disease)   Tetanus (Td) Vaccine - COST NOT COVERED BY MEDICARE PART B Following completion of primary series, a booster dose should be given every 10 years to maintain immunity against tetanus  Td may also be given as tetanus wound prophylaxis  Tdap Vaccine - COST NOT COVERED BY MEDICARE PART B Recommended at least once for all adults  For pregnant patients, recommended with each pregnancy  Shingles Vaccine (Shingrix) - COST NOT COVERED BY MEDICARE PART B  2 shot series recommended in those aged 48 and above     Health Maintenance Due:      Topic Date Due    DXA SCAN  Never done    Breast Cancer Screening: Mammogram  Never done    Colorectal Cancer Screening  Never done    Hepatitis C Screening  05/12/2024 (Originally 1950)     Immunizations Due:      Topic Date Due    Pneumococcal Vaccine: 65+ Years (1 - PCV) Never done    DTaP,Tdap,and Td Vaccines (1 - Tdap) 04/14/2021    COVID-19 Vaccine (3 - Booster for Leitha Melchor series) 02/21/2022     Advance Directives   What are advance directives? Advance directives are legal documents that state your wishes and plans for medical care  These plans are made ahead of time in case you lose your ability to make decisions for yourself   Advance directives can apply to any medical decision, such as the treatments you want, and if you want to donate organs  What are the types of advance directives? There are many types of advance directives, and each state has rules about how to use them  You may choose a combination of any of the following:  · Living will: This is a written record of the treatment you want  You can also choose which treatments you do not want, which to limit, and which to stop at a certain time  This includes surgery, medicine, IV fluid, and tube feedings  · Durable power of  for healthcare [facility]): This is a written record that states who you want to make healthcare choices for you when you are unable to make them for yourself  This person, called a proxy, is usually a family member or a friend  You may choose more than 1 proxy  · Do not resuscitate (DNR) order:  A DNR order is used in case your heart stops beating or you stop breathing  It is a request not to have certain forms of treatment, such as CPR  A DNR order may be included in other types of advance directives  · Medical directive: This covers the care that you want if you are in a coma, near death, or unable to make decisions for yourself  You can list the treatments you want for each condition  Treatment may include pain medicine, surgery, blood transfusions, dialysis, IV or tube feedings, and a ventilator (breathing machine)  · Values history: This document has questions about your views, beliefs, and how you feel and think about life  This information can help others choose the care that you would choose  Why are advance directives important? An advance directive helps you control your care  Although spoken wishes may be used, it is better to have your wishes written down  Spoken wishes can be misunderstood, or not followed  Treatments may be given even if you do not want them  An advance directive may make it easier for your family to make difficult choices about your care     Weight Management   Why it is important to manage your weight:  Being overweight increases your risk of health conditions such as heart disease, high blood pressure, type 2 diabetes, and certain types of cancer  It can also increase your risk for osteoarthritis, sleep apnea, and other respiratory problems  Aim for a slow, steady weight loss  Even a small amount of weight loss can lower your risk of health problems  How to lose weight safely:  A safe and healthy way to lose weight is to eat fewer calories and get regular exercise  You can lose up about 1 pound a week by decreasing the number of calories you eat by 500 calories each day  Healthy meal plan for weight management:  A healthy meal plan includes a variety of foods, contains fewer calories, and helps you stay healthy  A healthy meal plan includes the following:  · Eat whole-grain foods more often  A healthy meal plan should contain fiber  Fiber is the part of grains, fruits, and vegetables that is not broken down by your body  Whole-grain foods are healthy and provide extra fiber in your diet  Some examples of whole-grain foods are whole-wheat breads and pastas, oatmeal, brown rice, and bulgur  · Eat a variety of vegetables every day  Include dark, leafy greens such as spinach, kale, vilma greens, and mustard greens  Eat yellow and orange vegetables such as carrots, sweet potatoes, and winter squash  · Eat a variety of fruits every day  Choose fresh or canned fruit (canned in its own juice or light syrup) instead of juice  Fruit juice has very little or no fiber  · Eat low-fat dairy foods  Drink fat-free (skim) milk or 1% milk  Eat fat-free yogurt and low-fat cottage cheese  Try low-fat cheeses such as mozzarella and other reduced-fat cheeses  · Choose meat and other protein foods that are low in fat  Choose beans or other legumes such as split peas or lentils   Choose fish, skinless poultry (chicken or turkey), or lean cuts of red meat (beef or pork)  Before you cook meat or poultry, cut off any visible fat  · Use less fat and oil  Try baking foods instead of frying them  Add less fat, such as margarine, sour cream, regular salad dressing and mayonnaise to foods  Eat fewer high-fat foods  Some examples of high-fat foods include french fries, doughnuts, ice cream, and cakes  · Eat fewer sweets  Limit foods and drinks that are high in sugar  This includes candy, cookies, regular soda, and sweetened drinks  Exercise:  Exercise at least 30 minutes per day on most days of the week  Some examples of exercise include walking, biking, dancing, and swimming  You can also fit in more physical activity by taking the stairs instead of the elevator or parking farther away from stores  Ask your healthcare provider about the best exercise plan for you  © Copyright [employer] 2018 Information is for End User's use only and may not be sold, redistributed or otherwise used for commercial purposes   All illustrations and images included in CareNotes® are the copyrighted property of A D A M , Inc  or [address] [place] [address]

## 2022-05-18 ENCOUNTER — LAB (OUTPATIENT)
Dept: LAB | Facility: HOSPITAL | Age: 72
End: 2022-05-18
Payer: MEDICARE

## 2022-05-18 ENCOUNTER — OFFICE VISIT (OUTPATIENT)
Dept: DIABETES SERVICES | Facility: CLINIC | Age: 72
End: 2022-05-18
Payer: MEDICARE

## 2022-05-18 ENCOUNTER — TELEPHONE (OUTPATIENT)
Dept: DIABETES SERVICES | Facility: CLINIC | Age: 72
End: 2022-05-18

## 2022-05-18 DIAGNOSIS — E11.65 TYPE 2 DIABETES MELLITUS WITH HYPERGLYCEMIA, WITHOUT LONG-TERM CURRENT USE OF INSULIN (HCC): ICD-10-CM

## 2022-05-18 DIAGNOSIS — E11.9 TYPE 2 DIABETES MELLITUS WITHOUT COMPLICATION, WITHOUT LONG-TERM CURRENT USE OF INSULIN (HCC): Primary | ICD-10-CM

## 2022-05-18 DIAGNOSIS — E11.65 TYPE 2 DIABETES MELLITUS WITH HYPERGLYCEMIA, WITHOUT LONG-TERM CURRENT USE OF INSULIN (HCC): Primary | ICD-10-CM

## 2022-05-18 DIAGNOSIS — E83.52 HYPERCALCEMIA: ICD-10-CM

## 2022-05-18 DIAGNOSIS — Z97.8 USES SELF-APPLIED CONTINUOUS GLUCOSE MONITORING DEVICE: ICD-10-CM

## 2022-05-18 LAB
ANION GAP SERPL CALCULATED.3IONS-SCNC: 8 MMOL/L (ref 4–13)
BUN SERPL-MCNC: 23 MG/DL (ref 5–25)
CALCIUM SERPL-MCNC: 9.9 MG/DL (ref 8.3–10.1)
CHLORIDE SERPL-SCNC: 104 MMOL/L (ref 100–108)
CO2 SERPL-SCNC: 26 MMOL/L (ref 21–32)
CREAT SERPL-MCNC: 1.31 MG/DL (ref 0.6–1.3)
CREAT UR-MCNC: 129 MG/DL
GFR SERPL CREATININE-BSD FRML MDRD: 40 ML/MIN/1.73SQ M
GLUCOSE P FAST SERPL-MCNC: 123 MG/DL (ref 65–99)
MICROALBUMIN UR-MCNC: 165 MG/L (ref 0–20)
MICROALBUMIN/CREAT 24H UR: 128 MG/G CREATININE (ref 0–30)
POTASSIUM SERPL-SCNC: 3.8 MMOL/L (ref 3.5–5.3)
PTH-INTACT SERPL-MCNC: 67.3 PG/ML (ref 18.4–80.1)
SODIUM SERPL-SCNC: 138 MMOL/L (ref 136–145)

## 2022-05-18 PROCEDURE — 80048 BASIC METABOLIC PNL TOTAL CA: CPT

## 2022-05-18 PROCEDURE — 95249 CONT GLUC MNTR PT PROV EQP: CPT | Performed by: DIETITIAN, REGISTERED

## 2022-05-18 PROCEDURE — 36415 COLL VENOUS BLD VENIPUNCTURE: CPT

## 2022-05-18 PROCEDURE — 82570 ASSAY OF URINE CREATININE: CPT | Performed by: NURSE PRACTITIONER

## 2022-05-18 PROCEDURE — 83970 ASSAY OF PARATHORMONE: CPT

## 2022-05-18 PROCEDURE — 82043 UR ALBUMIN QUANTITATIVE: CPT | Performed by: NURSE PRACTITIONER

## 2022-05-18 NOTE — PROGRESS NOTES
Personal Subhash      Met with Davina Alvares today for a personal Breaker training  [place] Agent brought her own supplies to the visit which include sensors  The Breaker yg was downloaded to her phone during the visit  Patient will follow-up with her PCP as needed  She is scheduled with endocrinology on June 7th  Educator reviewed the following:    -- How to place the Atempo  -- Importance of site rotation  -- Atempo 2 set alerts  -- How to scan for a reading  -- 60 minute warm-up  -- If reading doesn't match symptoms, do a finger stick      Lab Results   Component Value Date    HGBA1C 11 8 (H) 04/09/2022         Patient response to instruction    Comprehension: good  Motivation: good  Expected Compliance: good  Response to Teachback: 100%, demonstrated understanding    Thank you for referring your patient to [facility], it was a pleasure working with them today  Please feel free to call with any questions or concerns      Sofya Caraballo MBA, RD, LDN, CDE  [place], [place] [address]

## 2022-05-18 NOTE — TELEPHONE ENCOUNTER
Pt is here now for Diabetes Education appt  You did issue a referral in April  Pt thought she was coming in for [employer]  Could you please issue a new referral and specify Kristian training  Thank you

## 2022-05-19 ENCOUNTER — TELEPHONE (OUTPATIENT)
Dept: FAMILY MEDICINE CLINIC | Facility: CLINIC | Age: 72
End: 2022-05-19

## 2022-05-19 DIAGNOSIS — E11.65 TYPE 2 DIABETES MELLITUS WITH HYPERGLYCEMIA, WITHOUT LONG-TERM CURRENT USE OF INSULIN (HCC): Primary | ICD-10-CM

## 2022-05-19 DIAGNOSIS — R79.89 ELEVATED SERUM CREATININE: ICD-10-CM

## 2022-05-19 NOTE — TELEPHONE ENCOUNTER
----- Message from [address] [place] [address] sent at 5/19/2022 11:20 AM EDT -----  Please let patient or her daughter know that her calcium has returned to normal range  Her creatinine level (checking for her kidney function) did slightly worsen  I simply just want to recheck this in 1 month  I placed the order  Thank you!

## 2022-06-03 ENCOUNTER — TELEPHONE (OUTPATIENT)
Dept: FAMILY MEDICINE CLINIC | Facility: CLINIC | Age: 72
End: 2022-06-03

## 2022-06-03 NOTE — TELEPHONE ENCOUNTER
----- Message from [address] [place] [address] sent at 6/3/2022 12:53 PM EDT -----  Please let patient know her cologuard sample could not be processed  They will be reaching out to discuss repeating sample  Thanks! Patient was informed

## 2022-06-07 ENCOUNTER — OFFICE VISIT (OUTPATIENT)
Dept: ENDOCRINOLOGY | Facility: CLINIC | Age: 72
End: 2022-06-07
Payer: MEDICARE

## 2022-06-07 VITALS
HEIGHT: 63 IN | HEART RATE: 73 BPM | DIASTOLIC BLOOD PRESSURE: 98 MMHG | SYSTOLIC BLOOD PRESSURE: 148 MMHG | BODY MASS INDEX: 33.31 KG/M2 | WEIGHT: 188 LBS

## 2022-06-07 DIAGNOSIS — E11.9 TYPE 2 DIABETES MELLITUS WITHOUT COMPLICATION, WITHOUT LONG-TERM CURRENT USE OF INSULIN (HCC): Primary | ICD-10-CM

## 2022-06-07 DIAGNOSIS — E11.65 TYPE 2 DIABETES MELLITUS WITH HYPERGLYCEMIA, WITHOUT LONG-TERM CURRENT USE OF INSULIN (HCC): ICD-10-CM

## 2022-06-07 DIAGNOSIS — E78.1 HYPERTRIGLYCERIDEMIA: ICD-10-CM

## 2022-06-07 DIAGNOSIS — N18.31 STAGE 3A CHRONIC KIDNEY DISEASE (HCC): ICD-10-CM

## 2022-06-07 DIAGNOSIS — E66.09 CLASS 1 OBESITY DUE TO EXCESS CALORIES WITH SERIOUS COMORBIDITY AND BODY MASS INDEX (BMI) OF 33.0 TO 33.9 IN ADULT: ICD-10-CM

## 2022-06-07 DIAGNOSIS — I10 BENIGN ESSENTIAL HTN: ICD-10-CM

## 2022-06-07 PROBLEM — E66.811 CLASS 1 OBESITY DUE TO EXCESS CALORIES WITH SERIOUS COMORBIDITY AND BODY MASS INDEX (BMI) OF 33.0 TO 33.9 IN ADULT: Status: ACTIVE | Noted: 2022-04-12

## 2022-06-07 PROCEDURE — 95251 CONT GLUC MNTR ANALYSIS I&R: CPT | Performed by: STUDENT IN AN ORGANIZED HEALTH CARE EDUCATION/TRAINING PROGRAM

## 2022-06-07 PROCEDURE — 99204 OFFICE O/P NEW MOD 45 MIN: CPT | Performed by: STUDENT IN AN ORGANIZED HEALTH CARE EDUCATION/TRAINING PROGRAM

## 2022-06-07 RX ORDER — METFORMIN HYDROCHLORIDE 500 MG/1
1000 TABLET, EXTENDED RELEASE ORAL 2 TIMES DAILY WITH MEALS
Qty: 180 TABLET | Refills: 1
Start: 2022-06-07 | End: 2022-06-28 | Stop reason: SDUPTHER

## 2022-06-07 NOTE — ASSESSMENT & PLAN NOTE
Recently started on lisinopril  Blood pressure elevated today  Will monitor and adjust medications accordingly   Will likely benefit from strong dosing of lisinopril

## 2022-06-07 NOTE — ASSESSMENT & PLAN NOTE
Presently on monotherapy with gemfibrozil  Anticipate improvement of triglycerides with dietary changes and improvement of diabetes  Will plan to repeat lipids in future   Would like to evaluate for statin but should not take it concurrently with gemfibrozil on account of drug interactions

## 2022-06-07 NOTE — PROGRESS NOTES
Tila Broderick 71 y o  female MRN: [id number]    Encounter: [id number]      Assessment/Plan     Problem List Items Addressed This Visit        Endocrine    Type 2 diabetes mellitus with hyperglycemia, without long-term current use of insulin ([facility] [address] )     Michael Griffin presents today for evaluation of newly diagnosed diabetes  We discussed the pathophysiology of diabetes and its associations with negative health outcomes  I counseled the patient on various goals of diabetes management, including healthy lifestyle and behaviors, glycemic targets, preventative health care, and the role of pharmacotherapies  Marian's diabetes appears to be improving significantly on basis of CGM data  A full Cgm report was not available to me this visit, but Michael Griffin and her daughter will link her [address] to our clinic remotely and notify me when they have done so so that I can take a more thorough look  Today, we discussed continuing her present anti-diabetic regimen, although I suggested to switch metformin to 500 mg BID AC to see if she tolerates the drug better that way  We will continue pioglitazone for now, which may also be helping with her elevated triglycerides  I would like to consider her for SGLT2i therapy in the future on account of her CKD with elevated urinary microalbumin  I provided Michael Griffin and her daughter a list of available drug options that they may consider  I did review side effects including hypovolemia and genitourinary mycotic infections  Michael Griffin will be meeting with CDE in the near future  She is also recommended to establish care with an ophthalmologist for a baseline retinal exam  Will RTC 3-mo with labs           Relevant Medications    metFORMIN (GLUCOPHAGE-XR) 500 mg 24 hr tablet       Cardiovascular and Mediastinum    Benign essential HTN     Recently started on lisinopril  Blood pressure elevated today  Will monitor and adjust medications accordingly   Will likely benefit from strong dosing of lisinopril Genitourinary    Stage 3a chronic kidney disease ([facility] [address] )     May benefit from SGLT2i              Other    Class 1 obesity due to excess calories with serious comorbidity and body mass index (BMI) of 33 0 to 33 9 in adult     Having weight loss  Will be meeting with diabetes education  Could benefit from anti-diabetic with weight loss promoting activity           Hypertriglyceridemia     Presently on monotherapy with gemfibrozil  Anticipate improvement of triglycerides with dietary changes and improvement of diabetes  Will plan to repeat lipids in future  Would like to evaluate for statin but should not take it concurrently with gemfibrozil on account of drug interactions             Other Visit Diagnoses     Type 2 diabetes mellitus without complication, without long-term current use of insulin ([facility])    -  Primary    Relevant Medications    metFORMIN (GLUCOPHAGE-XR) 500 mg 24 hr tablet    Other Relevant Orders    Basic metabolic panel Lab Collect    Lipid panel Lab Collect Lab Collect    HEMOGLOBIN A1C W/ EAG ESTIMATION Lab Collect        ADDENDUM 6/7/2022 @ 3:52PM    Received CGM download  Report below:    CGM Physician Report  Indication: type 2 diabetes  Device: Franco 2  Dates of sensor data: May 25 - June 7, 2022  Date sensor data printed: June 7, 2022     Analysis:   Mean   %CV  17 1%  GMI  5 8%    TIR  >250  0%  181-250 0%    99%  54-69  1%  <54  0%    Interpretation:  Excellent glycemic control! No indication to change therapy at present  CC: Diabetes    History of Present Illness     HPI:    Misbah Hurt presents today for evaluation of diabetes  She is joined today by her daughter and grandson  Her daughter Emile Moon) is contributing significantly to elements of the history  Misbah Hurt was recently diagnosed with diabetes during an ED presentation in April  Her A1c and serum glucose levels were found to be very elevated   Howes Agent was started on therapy with metformin XR 1000 mg once daily and pioglitazone 15 mg daily  She did not tolerate immediate release metformin well  She continues to have some GI symptoms with XR metformin  Prior to her diagnosis, Yaniv Thomas was not following regularly with health care providers  Azar Dumas notes that Yaniv Thomas had gone years without seeing a physician  She also comments that her mother stopped caring for herself after her  passed years ago  Since her diagnosis, she has had weight loss and has focused on healthy lifestyle  She will be having a meeting for nutrition counseling soon  Yaniv Thomas is presently on [facility] 2 sensor  She is unable to access her account today though to share with the clinic  I was able to look at her report over the mobile yg  Her blood sugars rarely exceed 140-150  She has not had any hypoglycemia  She denies any   hyperglycemic symptoms  She has not yet had an eye exam       For hypertension she takes lisinopril 10 mg daily and amlodipine 5 mg daily  Lisinopril was fairly recently started  For cholesterol she takes gemfibrozil 600 mg bid  She was previously on lipitor, although this was discontinued on account of high triglycerides  Review of Systems   Constitutional: Negative for fatigue and unexpected weight change  HENT: Negative for trouble swallowing and voice change  Eyes: Negative for photophobia and visual disturbance  Respiratory: Negative for shortness of breath  Cardiovascular: Negative for chest pain and palpitations  Gastrointestinal: Negative for abdominal pain, diarrhea, nausea and vomiting  Endocrine: Negative for polydipsia and polyuria  Neurological: Negative for tremors and weakness  Psychiatric/Behavioral: Negative for agitation and behavioral problems         Historical Information   Past Medical History:   Diagnosis Date    Diabetes mellitus ([address] )     Hypertension      Past Surgical History:   Procedure Laterality Date    ABDOMINAL SURGERY       Social History   Social History Substance and Sexual Activity   Alcohol Use Yes    Alcohol/week: 1 0 standard drink    Types: 1 Glasses of wine per week    Comment: 1 glass of wine weekly     Social History     Substance and Sexual Activity   Drug Use Never     Social History     Tobacco Use   Smoking Status Never Smoker   Smokeless Tobacco Never Used     Family History:   Family History   Problem Relation Age of Onset    Diabetes type II Mother     Alcohol abuse Neg Hx     Substance Abuse Neg Hx     Mental illness Neg Hx        Meds/Allergies   Current Outpatient Medications   Medication Sig Dispense Refill    amLODIPine (NORVASC) 5 mg tablet TAKE ONE TABLET BY MOUTH EVERY DAY 90 tablet 5    Continuous Blood Gluc  (FreeStyle Franco 2 [place]) KIRSTIE Use 1 Device in the morning 1 each 0    Continuous Blood Gluc Sensor (FreeStyle Franco 2 Sensor) MISC Use 1 application every 14 (fourteen) days 6 each 3    gemfibrozil (LOPID) 600 mg tablet Take 1 tablet (600 mg total) by mouth 2 (two) times a day before meals 180 tablet 5    lisinopril (ZESTRIL) 10 mg tablet Take 1 tablet (10 mg total) by mouth daily 90 tablet 5    metFORMIN (GLUCOPHAGE-XR) 500 mg 24 hr tablet Take 2 tablets (1,000 mg total) by mouth 2 (two) times a day with meals 180 tablet 1    pioglitazone (ACTOS) 15 mg tablet Take 1 tablet (15 mg total) by mouth daily 30 tablet 2     No current facility-administered medications for this visit  Allergies   Allergen Reactions    Penicillins Other (See Comments)     Unknown        Objective   Vitals: Blood pressure 148/98, pulse 73, height 5' 3" (1 6 m), weight 85 3 kg (188 lb)  Physical Exam  Vitals reviewed  Constitutional:       General: She is not in acute distress  HENT:      Head: Normocephalic and atraumatic  Eyes:      General: No scleral icterus  Conjunctiva/sclera: Conjunctivae normal    Cardiovascular:      Rate and Rhythm: Normal rate and regular rhythm        Pulses: no weak pulses          Dorsalis pedis pulses are 2+ on the right side and 2+ on the left side  Pulmonary:      Effort: Pulmonary effort is normal  No respiratory distress  Abdominal:      Palpations: Abdomen is soft  Tenderness: There is no abdominal tenderness  Musculoskeletal:      Right lower leg: No edema  Left lower leg: No edema  Feet:      Right foot:      Skin integrity: No ulcer, skin breakdown, erythema, warmth, callus or dry skin  Left foot:      Skin integrity: No ulcer, skin breakdown, erythema, warmth, callus or dry skin  Skin:     General: Skin is warm and dry  Neurological:      General: No focal deficit present  Mental Status: She is alert  Psychiatric:         Mood and Affect: Mood normal          Behavior: Behavior normal        Diabetic Foot Exam    Patient's shoes and socks removed  Right Foot/Ankle   Right Foot Inspection  Skin Exam: skin normal and skin intact  No dry skin, no warmth, no callus, no erythema, no maceration, no abnormal color, no pre-ulcer, no ulcer and no callus  Sensory   Vibration: intact  Monofilament testing: intact    Vascular  The right DP pulse is 2+  Left Foot/Ankle  Left Foot Inspection  Skin Exam: skin normal and skin intact  No dry skin, no warmth, no erythema, no maceration, normal color, no pre-ulcer, no ulcer and no callus  Sensory   Vibration: intact  Monofilament testing: intact    Vascular  The left DP pulse is 2+  Assign Risk Category  No deformity present  No loss of protective sensation  No weak pulses  Risk: 0    The history was obtained from the review of the chart, patient and family      Lab Results:   Lab Results   Component Value Date/Time    Hemoglobin A1C 11 8 (H) 04/09/2022 12:34 PM    WBC 7 24 04/09/2022 12:34 PM    Hemoglobin 13 8 04/09/2022 12:34 PM    Hematocrit 40 6 04/09/2022 12:34 PM    MCV 89 04/09/2022 12:34 PM    Platelets 134 48/42/8789 12:34 PM    BUN 23 05/18/2022 09:45 AM    BUN 26 (H) 04/28/2022 11:04 AM    BUN 16 04/09/2022 12:34 PM    Potassium 3 8 05/18/2022 09:45 AM    Potassium 4 0 04/28/2022 11:04 AM    Potassium 3 8 04/09/2022 12:34 PM    Chloride 104 05/18/2022 09:45 AM    Chloride 105 04/28/2022 11:04 AM    Chloride 99 (L) 04/09/2022 12:34 PM    CO2 26 05/18/2022 09:45 AM    CO2 26 04/28/2022 11:04 AM    CO2 28 04/09/2022 12:34 PM    Creatinine 1 31 (H) 05/18/2022 09:45 AM    Creatinine 1 10 04/28/2022 11:04 AM    Creatinine 1 00 04/09/2022 12:34 PM    AST 19 04/09/2022 12:34 PM    ALT 30 04/09/2022 12:34 PM    Albumin 3 1 (L) 04/09/2022 12:34 PM    HDL, Direct 35 (L) 04/09/2022 12:34 PM    Triglycerides 632 (H) 04/09/2022 12:34 PM           Imaging Studies: I have personally reviewed pertinent reports  Portions of the record may have been created with voice recognition software  Occasional wrong word or "sound a like" substitutions may have occurred due to the inherent limitations of voice recognition software  Read the chart carefully and recognize, using context, where substitutions have occurred

## 2022-06-07 NOTE — ASSESSMENT & PLAN NOTE
Having weight loss  Will be meeting with diabetes education   Could benefit from anti-diabetic with weight loss promoting activity

## 2022-06-07 NOTE — ASSESSMENT & PLAN NOTE
Cate Ramirez presents today for evaluation of newly diagnosed diabetes  We discussed the pathophysiology of diabetes and its associations with negative health outcomes  I counseled the patient on various goals of diabetes management, including healthy lifestyle and behaviors, glycemic targets, preventative health care, and the role of pharmacotherapies  Nini diabetes appears to be improving significantly on basis of CGM data  A full Cgm report was not available to me this visit, but Cate Ramirez and her daughter will link her  Jackie to our clinic remotely and notify me when they have done so so that I can take a more thorough look  Today, we discussed continuing her present anti-diabetic regimen, although I suggested to switch metformin to 500 mg BID AC to see if she tolerates the drug better that way  We will continue pioglitazone for now, which may also be helping with her elevated triglycerides  I would like to consider her for SGLT2i therapy in the future on account of her CKD with elevated urinary microalbumin  I provided Cate Ramirez and her daughter a list of available drug options that they may consider  I did review side effects including hypovolemia and genitourinary mycotic infections  Cate Ashley will be meeting with CDE in the near future   She is also recommended to establish care with an ophthalmologist for a baseline retinal exam  Will RTC 3-mo with labs

## 2022-06-26 LAB — COLOGUARD RESULT REPORTABLE: POSITIVE

## 2022-06-27 ENCOUNTER — TELEPHONE (OUTPATIENT)
Dept: FAMILY MEDICINE CLINIC | Facility: CLINIC | Age: 72
End: 2022-06-27

## 2022-06-27 NOTE — TELEPHONE ENCOUNTER
----- Message from Christiano Pierce PA-C sent at 6/27/2022  1:29 PM EDT -----  Please call patient and let her know her cologard test was positive and she will need to schedule a colonoscopy  Thanks

## 2022-06-28 DIAGNOSIS — E11.9 TYPE 2 DIABETES MELLITUS WITHOUT COMPLICATION, WITHOUT LONG-TERM CURRENT USE OF INSULIN (HCC): ICD-10-CM

## 2022-06-28 RX ORDER — METFORMIN HYDROCHLORIDE 500 MG/1
1000 TABLET, EXTENDED RELEASE ORAL 2 TIMES DAILY WITH MEALS
Qty: 180 TABLET | Refills: 1
Start: 2022-06-28 | End: 2022-07-06 | Stop reason: SDUPTHER

## 2022-06-28 RX ORDER — PIOGLITAZONEHYDROCHLORIDE 15 MG/1
15 TABLET ORAL DAILY
Qty: 90 TABLET | Refills: 1 | Status: SHIPPED | OUTPATIENT
Start: 2022-06-28

## 2022-07-06 ENCOUNTER — TELEPHONE (OUTPATIENT)
Dept: FAMILY MEDICINE CLINIC | Facility: CLINIC | Age: 72
End: 2022-07-06

## 2022-07-06 DIAGNOSIS — E11.9 TYPE 2 DIABETES MELLITUS WITHOUT COMPLICATION, WITHOUT LONG-TERM CURRENT USE OF INSULIN (HCC): ICD-10-CM

## 2022-07-06 RX ORDER — METFORMIN HYDROCHLORIDE 500 MG/1
1000 TABLET, EXTENDED RELEASE ORAL 2 TIMES DAILY WITH MEALS
Qty: 180 TABLET | Refills: 1 | Status: CANCELLED
Start: 2022-07-06 | End: 2023-01-02

## 2022-07-06 RX ORDER — METFORMIN HYDROCHLORIDE 500 MG/1
1000 TABLET, EXTENDED RELEASE ORAL 2 TIMES DAILY WITH MEALS
Qty: 180 TABLET | Refills: 1
Start: 2022-07-06 | End: 2022-09-07 | Stop reason: ALTCHOICE

## 2022-07-18 ENCOUNTER — TELEPHONE (OUTPATIENT)
Dept: FAMILY MEDICINE CLINIC | Facility: CLINIC | Age: 72
End: 2022-07-18

## 2022-07-18 NOTE — TELEPHONE ENCOUNTER
Patient called regarding her metformin she wanted to see if there is a way to lower the dosage she is getting sick and thrown up 2xs since she started taking it  Patient is taking 2 500mg pill in AM     She is nauseous all day

## 2022-07-25 ENCOUNTER — TELEPHONE (OUTPATIENT)
Dept: FAMILY MEDICINE CLINIC | Facility: CLINIC | Age: 72
End: 2022-07-25

## 2022-07-25 NOTE — TELEPHONE ENCOUNTER
Patient's daughter called and wanted to know if there are any other vitamins, besides Vitamin C, that her mother should avoid because of taking metformin

## 2022-08-17 ENCOUNTER — RA CDI HCC (OUTPATIENT)
Dept: OTHER | Facility: HOSPITAL | Age: 72
End: 2022-08-17

## 2022-08-17 NOTE — PROGRESS NOTES
[address]  coding opportunities     E11 [id number]     Chart Reviewed number of suggestions sent to Provider: 1     Patients Insurance     Medicare Insurance: [employer]

## 2022-08-23 ENCOUNTER — OFFICE VISIT (OUTPATIENT)
Dept: FAMILY MEDICINE CLINIC | Facility: CLINIC | Age: 72
End: 2022-08-23
Payer: MEDICARE

## 2022-08-23 VITALS
SYSTOLIC BLOOD PRESSURE: 130 MMHG | RESPIRATION RATE: 14 BRPM | WEIGHT: 172.1 LBS | HEIGHT: 63 IN | BODY MASS INDEX: 30.49 KG/M2 | DIASTOLIC BLOOD PRESSURE: 88 MMHG | HEART RATE: 68 BPM | OXYGEN SATURATION: 98 %

## 2022-08-23 DIAGNOSIS — E11.65 TYPE 2 DIABETES MELLITUS WITH HYPERGLYCEMIA, WITHOUT LONG-TERM CURRENT USE OF INSULIN (HCC): ICD-10-CM

## 2022-08-23 DIAGNOSIS — I10 BENIGN ESSENTIAL HTN: Primary | ICD-10-CM

## 2022-08-23 DIAGNOSIS — N18.31 STAGE 3A CHRONIC KIDNEY DISEASE (HCC): ICD-10-CM

## 2022-08-23 PROCEDURE — 99214 OFFICE O/P EST MOD 30 MIN: CPT | Performed by: NURSE PRACTITIONER

## 2022-08-23 NOTE — PROGRESS NOTES
Assessment/Plan:     Diagnoses and all orders for this visit:    Benign essential HTN    Continue Amlodipine daily  Type 2 diabetes mellitus with hyperglycemia, without long-term [facility] use of insulin [facility]    Managed by Endocrinology w/ Actos & Metformin  Pt does have Freestyle Franco for CGM  Pt has upcoming appt in September w/ Endo and will discuss continued GI issues possibly r/t Metformin and possible start of SGLT2i as per Endocrinology notes  Pt also encouraged to establish with eye doctor for Diabetic eye exam     Stage 3a chronic kidney disease ([facility] [address] )      Last Cr at 1 31 in May  Pt never completed lab work that was to be rechecked in 1 month  Check CMP  Pt to RTO in 6 months for recheck or sooner PRN  Subjective:      Patient ID: Virginia Mix is a 71 y o  female  Pt presents today for a 3 month follow up  She has since been able to establish with her Endocrinologist who has been managing her diabetes  She does note that she has been feeling well  Her biggest complaint today is GI symptoms she believes to be r/t her Metformin  She did discuss this with her endocrinologist   Per Endocrinology, there was consideration for SGLT2 agent however, no changes made and this was to be discussed at her September appointment  The following portions of the patient's history were reviewed and updated as appropriate: allergies, current medications, past family history, past medical history, past social history, past surgical history and problem list     Review of Systems    As noted per HPI  Objective:      /88   Pulse 68   Resp 14   Ht 5' 3" (1 6 m)   Wt 78 1 kg (172 lb 1 6 oz)   SpO2 98%   BMI 30 49 kg/m²          Physical Exam  Constitutional:       General: She is not in acute distress  Appearance: Normal appearance  She is not ill-appearing  Cardiovascular:      Pulses: Normal pulses  Heart sounds: Normal heart sounds  No murmur heard    Pulmonary:      Effort: Pulmonary effort is normal  No respiratory distress  Breath sounds: Normal breath sounds  No wheezing  Neurological:      Mental Status: She is alert  Psychiatric:         Mood and Affect: Mood normal          Behavior: Behavior normal          Thought Content:  Thought content normal          Judgment: Judgment normal

## 2022-08-25 NOTE — PATIENT INSTRUCTIONS

## 2022-09-06 ENCOUNTER — APPOINTMENT (OUTPATIENT)
Dept: LAB | Facility: CLINIC | Age: 72
End: 2022-09-06
Payer: MEDICARE

## 2022-09-06 DIAGNOSIS — E11.9 TYPE 2 DIABETES MELLITUS WITHOUT COMPLICATION, WITHOUT LONG-TERM CURRENT USE OF INSULIN (HCC): ICD-10-CM

## 2022-09-06 LAB
ANION GAP SERPL CALCULATED.3IONS-SCNC: 6 MMOL/L (ref 4–13)
BUN SERPL-MCNC: 26 MG/DL (ref 5–25)
CALCIUM SERPL-MCNC: 10.4 MG/DL (ref 8.3–10.1)
CHLORIDE SERPL-SCNC: 107 MMOL/L (ref 96–108)
CHOLEST SERPL-MCNC: 242 MG/DL
CO2 SERPL-SCNC: 26 MMOL/L (ref 21–32)
CREAT SERPL-MCNC: 1.35 MG/DL (ref 0.6–1.3)
GFR SERPL CREATININE-BSD FRML MDRD: 39 ML/MIN/1.73SQ M
GLUCOSE P FAST SERPL-MCNC: 82 MG/DL (ref 65–99)
HDLC SERPL-MCNC: 60 MG/DL
LDLC SERPL CALC-MCNC: 158 MG/DL (ref 0–100)
NONHDLC SERPL-MCNC: 182 MG/DL
POTASSIUM SERPL-SCNC: 4.2 MMOL/L (ref 3.5–5.3)
SODIUM SERPL-SCNC: 139 MMOL/L (ref 135–147)
TRIGL SERPL-MCNC: 120 MG/DL

## 2022-09-06 PROCEDURE — 83036 HEMOGLOBIN GLYCOSYLATED A1C: CPT

## 2022-09-06 PROCEDURE — 80061 LIPID PANEL: CPT

## 2022-09-06 PROCEDURE — 80048 BASIC METABOLIC PNL TOTAL CA: CPT

## 2022-09-06 PROCEDURE — 36415 COLL VENOUS BLD VENIPUNCTURE: CPT

## 2022-09-07 ENCOUNTER — OFFICE VISIT (OUTPATIENT)
Dept: ENDOCRINOLOGY | Facility: CLINIC | Age: 72
End: 2022-09-07
Payer: MEDICARE

## 2022-09-07 VITALS
SYSTOLIC BLOOD PRESSURE: 142 MMHG | DIASTOLIC BLOOD PRESSURE: 70 MMHG | HEIGHT: 63 IN | WEIGHT: 172.2 LBS | HEART RATE: 70 BPM | BODY MASS INDEX: 30.51 KG/M2

## 2022-09-07 DIAGNOSIS — E11.9 TYPE 2 DIABETES MELLITUS WITHOUT COMPLICATION, WITHOUT LONG-TERM CURRENT USE OF INSULIN (HCC): Primary | ICD-10-CM

## 2022-09-07 DIAGNOSIS — Z78.0 POST-MENOPAUSAL: ICD-10-CM

## 2022-09-07 DIAGNOSIS — E78.2 MIXED HYPERLIPIDEMIA: ICD-10-CM

## 2022-09-07 DIAGNOSIS — E83.52 HYPERCALCEMIA: ICD-10-CM

## 2022-09-07 DIAGNOSIS — N18.31 STAGE 3A CHRONIC KIDNEY DISEASE (HCC): ICD-10-CM

## 2022-09-07 DIAGNOSIS — I10 BENIGN ESSENTIAL HTN: ICD-10-CM

## 2022-09-07 LAB
EST. AVERAGE GLUCOSE BLD GHB EST-MCNC: 111 MG/DL
HBA1C MFR BLD: 5.5 %

## 2022-09-07 PROCEDURE — 95251 CONT GLUC MNTR ANALYSIS I&R: CPT | Performed by: STUDENT IN AN ORGANIZED HEALTH CARE EDUCATION/TRAINING PROGRAM

## 2022-09-07 PROCEDURE — 99214 OFFICE O/P EST MOD 30 MIN: CPT | Performed by: STUDENT IN AN ORGANIZED HEALTH CARE EDUCATION/TRAINING PROGRAM

## 2022-09-07 RX ORDER — METFORMIN HYDROCHLORIDE 500 MG/1
500 TABLET, EXTENDED RELEASE ORAL 2 TIMES DAILY WITH MEALS
Qty: 180 TABLET | Refills: 1 | Status: CANCELLED | OUTPATIENT
Start: 2022-09-07 | End: 2023-03-06

## 2022-09-07 RX ORDER — ATORVASTATIN CALCIUM 20 MG/1
20 TABLET, FILM COATED ORAL DAILY
Qty: 90 TABLET | Refills: 1 | Status: SHIPPED | OUTPATIENT
Start: 2022-09-07

## 2022-09-07 NOTE — ASSESSMENT & PLAN NOTE
New problem  Provided cursory review of calcium and parathyroid hormone physiology and pathophysiology  Will plan to include relevant testing with next orders, including a request for a baseline DXA

## 2022-09-07 NOTE — ASSESSMENT & PLAN NOTE
Triglycerides improved within normal range while on gemfibrozil  However, LDL elevated  Will discontinue gemfibrozil and re-start lipitor at 20 mg daily  Will repeat lipids with next labs

## 2022-09-07 NOTE — ASSESSMENT & PLAN NOTE
Diabetes is in excellent control  Today, we discontinued metformin on account of worsening CKD and intolerance to therapy  Lalo Martinez will continue low dose pioglitazone 15 mg daily  She was advised to monitor CGM off metformin for a 2-week period  If BGs stable, I suggested she may discontinue CGM and perform intermittent fingerstick testing instead  In the future, I would like to consider Lalo Martinez for SGLT2i therapy  However, I want to first establish whether her GFR is at baseline

## 2022-09-07 NOTE — PROGRESS NOTES
Taran Beard 71 y o  female MRN: [id number]    Encounter: [id number]      Assessment/Plan     Problem List Items Addressed This Visit        Endocrine    Type 2 diabetes mellitus without complication, without long-term current use of insulin ([facility] [address] ) - Primary     Diabetes is in excellent control  Today, we discontinued metformin on account of worsening CKD and intolerance to therapy  Nishant Mandel will continue low dose pioglitazone 15 mg daily  She was advised to monitor CGM off metformin for a 2-week period  If BGs stable, I suggested she may discontinue CGM and perform intermittent fingerstick testing instead  In the future, I would like to consider Nishant Mandel for SGLT2i therapy  However, I want to first establish whether her GFR is at baseline  Relevant Orders    Comprehensive metabolic panel Lab Collect    HEMOGLOBIN A1C W/ EAG ESTIMATION Lab Collect       Cardiovascular and Mediastinum    Benign essential HTN     Well controlled  Continue present therapy            Genitourinary    Stage 3a chronic kidney disease (HCC)     GFR has diminished since earlier this year  I advised avoidance of NSAIDS and adequate hydration  Will hold metformin and repeat testing in several months for monitoring of renal function  If ongoing evidence of deterioration, may refer to nephrology for additional support  Other    Mixed hyperlipidemia     Triglycerides improved within normal range while on gemfibrozil  However, LDL elevated  Will discontinue gemfibrozil and re-start lipitor at 20 mg daily  Will repeat lipids with next labs  Relevant Medications    atorvastatin (LIPITOR) 20 mg tablet    Other Relevant Orders    Lipid panel Lab Collect Lab Collect    Hypercalcemia     New problem  Provided cursory review of calcium and parathyroid hormone physiology and pathophysiology  Will plan to include relevant testing with next orders, including a request for a baseline DXA            Relevant Orders Comprehensive metabolic panel Lab Collect    Phosphorus Lab Collect    Vitamin D 25 hydroxy Lab Collect    PTH, intact Lab Collect Lab Collect    Microalbumin / creatinine urine ratio Lab Collect    Calcium, ionized      Other Visit Diagnoses     Post-menopausal        Relevant Orders    DXA bone density spine hip and pelvis        CC: Diabetes    History of Present Illness     HPI:    Aleksandra Khan returns today for follow up of diabetes  She is joined today by her daughter and grandson (Carole)  Her daughter Lindsay Spann) is contributing significantly to elements of the history  For diabetes, Aleksandra Khan is taking metformin 1000 mg twice daily and pioglitazone 15 mg daily  Aleksandra Khan is reporting GI symptoms including diarrhea and nausea  She is on [place] sensor  She denies any hypoglycemia or hyperglycemic symptoms  She has not yet had an eye exam, although she is in the process of having this arranged  For hypertension she takes lisinopril 10 mg daily and amlodipine 5 mg daily  For cholesterol she takes gemfibrozil 600 mg bid  She was previously on lipitor, although this was discontinued on account of high triglycerides  Triglycerides have improved  Regarding calcium, Aleksandra Khan takes Vit D3 daily  She has not had an updated DXA study  Review of Systems   Constitutional: Negative for fatigue and unexpected weight change  HENT: Negative for trouble swallowing and voice change  Eyes: Negative for photophobia and visual disturbance  Respiratory: Negative for shortness of breath  Cardiovascular: Negative for chest pain and palpitations  Gastrointestinal: Negative for abdominal pain, diarrhea, nausea and vomiting  Endocrine: Negative for polydipsia and polyuria  Neurological: Negative for tremors and weakness  Psychiatric/Behavioral: Negative for agitation and behavioral problems         Historical Information   Past Medical History:   Diagnosis Date    Diabetes mellitus ([facility] [address] )     Hypertension      Past Surgical History:   Procedure Laterality Date    ABDOMINAL SURGERY       Social History   Social History     Substance and Sexual Activity   Alcohol Use Yes    Alcohol/week: 1 0 standard drink    Types: 1 Glasses of wine per week    Comment: 1 glass of wine weekly     Social History     Substance and Sexual Activity   Drug Use Never     Social History     Tobacco Use   Smoking Status Never Smoker   Smokeless Tobacco Never Used     Family History:   Family History   Problem Relation Age of Onset    Diabetes type II Mother     Alcohol abuse Neg Hx     Substance Abuse Neg Hx     Mental illness Neg Hx        Meds/Allergies   Current Outpatient Medications   Medication Sig Dispense Refill    amLODIPine (NORVASC) 5 mg tablet TAKE ONE TABLET BY MOUTH EVERY DAY 90 tablet 5    atorvastatin (LIPITOR) 20 mg tablet Take 1 tablet (20 mg total) by mouth daily 90 tablet 1    Continuous Blood Gluc  (FreeStyle Franco 2 [place]) KIRSTIE Use 1 Device in the morning 1 each 0    Continuous Blood Gluc Sensor (FreeStyle Franco 2 Sensor) MISC Use 1 application every 14 (fourteen) days 6 each 3    lisinopril (ZESTRIL) 10 mg tablet Take 1 tablet (10 mg total) by mouth daily 90 tablet 5    pioglitazone (ACTOS) 15 mg tablet Take 1 tablet (15 mg total) by mouth daily 90 tablet 1     No current facility-administered medications for this visit  Allergies   Allergen Reactions    Penicillins Other (See Comments)     Unknown        Objective   Vitals: Blood pressure 142/70, pulse 70, height 5' 3" (1 6 m), weight 78 1 kg (172 lb 3 2 oz)  Physical Exam  Vitals reviewed  Constitutional:       General: She is not in acute distress  HENT:      Head: Normocephalic and atraumatic  Eyes:      General: No scleral icterus  Conjunctiva/sclera: Conjunctivae normal    Cardiovascular:      Rate and Rhythm: Normal rate and regular rhythm  Pulmonary:      Effort: Pulmonary effort is normal  No respiratory distress     Abdominal: Palpations: Abdomen is soft  Tenderness: There is no abdominal tenderness  Musculoskeletal:      Right lower leg: No edema  Left lower leg: No edema  Skin:     General: Skin is warm and dry  Neurological:      General: No focal deficit present  Mental Status: She is alert  Psychiatric:         Mood and Affect: Mood normal          Behavior: Behavior normal        The history was obtained from the review of the chart, patient and family      Lab Results:   Lab Results   Component Value Date/Time    Hemoglobin A1C 5 5 09/06/2022 01:36 PM    Hemoglobin A1C 11 8 (H) 04/09/2022 12:34 PM    WBC 7 24 04/09/2022 12:34 PM    Hemoglobin 13 8 04/09/2022 12:34 PM    Hematocrit 40 6 04/09/2022 12:34 PM    MCV 89 04/09/2022 12:34 PM    Platelets 164 02/01/2799 12:34 PM    BUN 26 (H) 09/06/2022 01:36 PM    BUN 23 05/18/2022 09:45 AM    BUN 26 (H) 04/28/2022 11:04 AM    Potassium 4 2 09/06/2022 01:36 PM    Potassium 3 8 05/18/2022 09:45 AM    Potassium 4 0 04/28/2022 11:04 AM    Chloride 107 09/06/2022 01:36 PM    Chloride 104 05/18/2022 09:45 AM    Chloride 105 04/28/2022 11:04 AM    CO2 26 09/06/2022 01:36 PM    CO2 26 05/18/2022 09:45 AM    CO2 26 04/28/2022 11:04 AM    Creatinine 1 35 (H) 09/06/2022 01:36 PM    Creatinine 1 31 (H) 05/18/2022 09:45 AM    Creatinine 1 10 04/28/2022 11:04 AM    AST 19 04/09/2022 12:34 PM    ALT 30 04/09/2022 12:34 PM    Albumin 3 1 (L) 04/09/2022 12:34 PM    HDL, Direct 60 09/06/2022 01:36 PM    HDL, Direct 35 (L) 04/09/2022 12:34 PM    Triglycerides 120 09/06/2022 01:36 PM    Triglycerides 632 (H) 04/09/2022 12:34 PM     Lab Results   Component Value Date    LDLCALC 158 (H) 09/06/2022     CGM Physician Report  Indication: type 2 diabetes  Device: john 2  Dates of sensor data: Aug 24 - Sep 6, 2022  Date sensor data printed: Sep 7, 2022     Analysis:   Mean  mg/dL  %CV  14 6%  GMI  -    TIR  >250  0%  181-250 0%    100%  54-69  0%  <54  0%    Interpretation:  Excellent diabetes control  No need for ongoing use of CGM  Will discontinue metformin on account of GFR and GI symptoms  Monitor on monotherapy with pioglitazone 15 mg daily for time being  Imaging Studies: I have personally reviewed pertinent reports  Portions of the record may have been created with voice recognition software  Occasional wrong word or "sound a like" substitutions may have occurred due to the inherent limitations of voice recognition software  Read the chart carefully and recognize, using context, where substitutions have occurred

## 2022-09-07 NOTE — PATIENT INSTRUCTIONS
Stop metformin    Continue pioglitazone 15 mg daily    Labs in 3-months  Schedule DXA (bone health) study    Check sugars daily    Best times to monitor are on waking, before meals or bedtime  Ok to alternate different times of day    Goal Blood Sugars:   Premeal , even better <110  2hr after a meal <180, even better <140  A1C <7%, even better <6 5%  Aim for 45g carbohydrates with meals  15-20g with snacks    Goal exercise is 30 minutes daily, most days of the week    Please have labs done before next visit    Bring your meter or logbook with you each visit    Return appointment 4 months      Goal calcium intake 800 mg daily, preferably through diet (as below)    Dietary sources of calcium and the amount of calcium (in milligrams) provided per serving are listed below  The actual amount of calcium may differ slightly depending upon the brand of the food       Dairy:   1 cup of skim milk (302 mg)  1 cup of 2 percent milk (297 mg)  1 cup of whole milk (291 mg)  1 cup of buttermilk (285 mg)  1 cup of lactose reduced milk (285-302 mg)  1/3 cup of powdered nonfat milk (283 mg)  1 cup of lowfat plain yogurt (415 mg)  1 cup of lowfat fruit yogurt (245-384 mg)  1/2 cup ice cream (100 mg)  1 cup sour cream, cultured (250 mg)  1 cup of cottage cheese made with one percent milk fat (138 mg)  1 5 ounces of part skim mozzarella cheese (275 mg)  1 0 ounces of hard cheese (cheddar or jcarlos) (200 mg)  1 ounce Brie cheese (50 mg)  1 Tablespoon Parmesan Cheese (70 mg)  1 ounce Swiss or Gruyere cheese (270 mg)    Dairy Alternatives:   1 cup Soy milk, calcium fortified (200 to 400 mg)  1 cup [place] milk, calcium fortified (450 mg)      Fruits and Vegetables:   1 cup of calcium-fortified orange juice (300 mg)  1 cup of orange juice from concentrate (20 mg)  1 cup acorn squash, cooked (90 mg)  1 cup raw arugula (125 mg)  1 cup raw bok michelle (40 mg)  1 cup chard or okra, cooked (100 mg)  1 cup spinach, cooked (240 mg)  1/2 cup of cooked vilma greens (220 mg)  1/2 cup of cooked turnip greens (99 mg)  1/2 cup of steamed broccoli (47 mg)  1/2 cup of cooked kale (45 mg)  1 cup, dried, uncooked figs (300 mg)  1 cup raw kiwi (50 mg)    Legumes:  1 cup garbanzo beans (80 mg)  1/2 cup Legumes, general, cooked (15-50 mg)  1 cup hunt beans (75)  1/2 cup boiled soy beans (100 mg)  1/2 cup tempeh (75 mg)  4 oz Tofu, firm, calcium set (250-750 mg)  4 oz Tofu, soft regular (120-390 mg)  1/2 cup White beans, cooked (70 mg)    Grains:  1/2 to 1 cup Cereals (calcium fortified) (250-1000 mg)  1/2 cup amaranth (635 mg)  1 slice bread, calcium fortified (150-200 mg)  1 cup brown rice, long grain, raw (50 mg)  1 package oatmeal (100-150 mg)  2 corn tortillas 85 mg)    Seafood:   4 canned sardines, with bones (242 mg)  3 ounces of cooked crab (50 mg)  3 ounces of [place], canned with bones (170 to 210 mg)  3 ounces of canned mackerel (250 mg)    Nuts and Seeds:   1 ounce almonds, toasted unblanched (80 mg)  1 ounce sesame seeds, whole roasted (280 mg)  1 ounce (2 Tbsp) Sesame tahini (130 mg)  1 ounce dried sunflower seeds (50 mg)    Desserts:   1/2 cup of frozen yogurt (103 mg)  1/2 cup of vanilla ice cream (85 mg)    Other:  1 tbsp Blackstrap Molasses (135 mg)    What are some ways to add extra calcium to the foods I eat? You can increase your calcium intake by adding foods that contain calcium to the foods you normally eat  For example, non-fat powdered dry milk has about 52 mg of calcium in one tablespoon  You can add powdered milk to several different foods  The following are some ideas for adding extra calcium to your foods:   Add 3 tablespoons of powdered milk to each cup of milk in puddings, cocoa, or custard  Add 4 tablespoons of powdered milk to each cup of hot cereal before cooking  Sift two tablespoons of powdered milk into each cup of flour in cakes, cookies, or breads  Use lowfat or fat free milk instead of water in pancake mix, mashed potatoes, pudding, and hot breakfast cereals  Add lowfat or fat free cheese to salad, soup, or pasta  Add tofu with added calcium to vegetable stir jeffery

## 2022-09-07 NOTE — ASSESSMENT & PLAN NOTE
GFR has diminished since earlier this year  I advised avoidance of NSAIDS and adequate hydration  Will hold metformin and repeat testing in several months for monitoring of renal function  If ongoing evidence of deterioration, may refer to nephrology for additional support

## 2022-10-03 ENCOUNTER — TELEPHONE (OUTPATIENT)
Dept: GASTROENTEROLOGY | Facility: CLINIC | Age: 72
End: 2022-10-03

## 2022-10-03 NOTE — TELEPHONE ENCOUNTER
Patients GI provider:  New pt    Number to return call: [phone]    Reason for call: Pt called in stating that she was told that she could cancel her appt for positive cologuard as long she didn't have any symptoms or on any blood thinners  Cancelled her appt and pt would like to schedule her colonoscopy  Above is her number       Scheduled procedure/appointment date if applicable: Apt/procedure NA

## 2022-12-07 ENCOUNTER — APPOINTMENT (OUTPATIENT)
Dept: LAB | Facility: CLINIC | Age: 72
End: 2022-12-07

## 2022-12-07 DIAGNOSIS — E83.52 HYPERCALCEMIA: ICD-10-CM

## 2022-12-07 DIAGNOSIS — E78.2 MIXED HYPERLIPIDEMIA: ICD-10-CM

## 2022-12-07 DIAGNOSIS — E11.9 TYPE 2 DIABETES MELLITUS WITHOUT COMPLICATION, WITHOUT LONG-TERM CURRENT USE OF INSULIN (HCC): ICD-10-CM

## 2022-12-07 LAB
25(OH)D3 SERPL-MCNC: 50.3 NG/ML (ref 30–100)
ALBUMIN SERPL BCP-MCNC: 3.3 G/DL (ref 3.5–5)
ALP SERPL-CCNC: 85 U/L (ref 46–116)
ALT SERPL W P-5'-P-CCNC: 22 U/L (ref 12–78)
ANION GAP SERPL CALCULATED.3IONS-SCNC: 5 MMOL/L (ref 4–13)
AST SERPL W P-5'-P-CCNC: 15 U/L (ref 5–45)
BILIRUB SERPL-MCNC: 0.71 MG/DL (ref 0.2–1)
BUN SERPL-MCNC: 21 MG/DL (ref 5–25)
CA-I BLD-SCNC: 1.24 MMOL/L (ref 1.12–1.32)
CALCIUM ALBUM COR SERPL-MCNC: 10.6 MG/DL (ref 8.3–10.1)
CALCIUM SERPL-MCNC: 10 MG/DL (ref 8.3–10.1)
CHLORIDE SERPL-SCNC: 106 MMOL/L (ref 96–108)
CHOLEST SERPL-MCNC: 168 MG/DL
CO2 SERPL-SCNC: 27 MMOL/L (ref 21–32)
CREAT SERPL-MCNC: 0.97 MG/DL (ref 0.6–1.3)
CREAT UR-MCNC: 136 MG/DL
EST. AVERAGE GLUCOSE BLD GHB EST-MCNC: 123 MG/DL
GFR SERPL CREATININE-BSD FRML MDRD: 58 ML/MIN/1.73SQ M
GLUCOSE P FAST SERPL-MCNC: 100 MG/DL (ref 65–99)
HBA1C MFR BLD: 5.9 %
HDLC SERPL-MCNC: 49 MG/DL
LDLC SERPL CALC-MCNC: 83 MG/DL (ref 0–100)
MICROALBUMIN UR-MCNC: 103 MG/L (ref 0–20)
MICROALBUMIN/CREAT 24H UR: 76 MG/G CREATININE (ref 0–30)
NONHDLC SERPL-MCNC: 119 MG/DL
PHOSPHATE SERPL-MCNC: 3.8 MG/DL (ref 2.3–4.1)
POTASSIUM SERPL-SCNC: 3.9 MMOL/L (ref 3.5–5.3)
PROT SERPL-MCNC: 8 G/DL (ref 6.4–8.4)
PTH-INTACT SERPL-MCNC: 57 PG/ML (ref 18.4–80.1)
SODIUM SERPL-SCNC: 138 MMOL/L (ref 135–147)
TRIGL SERPL-MCNC: 179 MG/DL

## 2022-12-09 ENCOUNTER — TELEMEDICINE (OUTPATIENT)
Dept: ENDOCRINOLOGY | Facility: CLINIC | Age: 72
End: 2022-12-09

## 2022-12-09 DIAGNOSIS — E83.52 HYPERCALCEMIA: ICD-10-CM

## 2022-12-09 DIAGNOSIS — R80.9 TYPE 2 DIABETES MELLITUS WITH MICROALBUMINURIA, WITHOUT LONG-TERM CURRENT USE OF INSULIN (HCC): Primary | ICD-10-CM

## 2022-12-09 DIAGNOSIS — I10 ESSENTIAL HYPERTENSION: ICD-10-CM

## 2022-12-09 DIAGNOSIS — E11.29 TYPE 2 DIABETES MELLITUS WITH MICROALBUMINURIA, WITHOUT LONG-TERM CURRENT USE OF INSULIN (HCC): Primary | ICD-10-CM

## 2022-12-09 RX ORDER — LISINOPRIL 20 MG/1
20 TABLET ORAL DAILY
Qty: 90 TABLET | Refills: 1 | Status: SHIPPED | OUTPATIENT
Start: 2022-12-09

## 2022-12-09 NOTE — Clinical Note
Can patient be arranged for 4 month follow up? Labs were ordered, can she also be provided number for central scheduling to schedule DXA study?

## 2022-12-09 NOTE — PROGRESS NOTES
Virtual Regular Visit    Verification of patient location:    Patient is located in the following state in which I hold an active license [place]      Assessment/Plan:    1  Type 2 DM c/b microalbuminuria - excellent control  Continue pioglitazone 15 mg daily  Recommend increasing lisinopril 20 mg daily on account of persistent microalbuminuria  Blood pressures reviewed in EMR, can tolerate the dose increase  Will arrange follow up labs for monitoring of serum potassium  2  Hypertension - increase lisinopril 20 mg daily 2/2 microalbuminuria  Continue amlodipine    3  Hyperlipidemia - improving  Continue lipitor 20 mg daily  Pioglitazone may be helping with triglycerides, which have previously been an issue  Triglycerides are in reasonable control    4  Hypercalcemia - mild elevation of corrected calcium, nl ionized calcium, high normal phose, wnl vit D, mid normal PTH  Hyperparathyroidism is not evident  Did advise patient to follow up on DXA scan, which was previously discussed  Patient states plan to arrange  Problem List Items Addressed This Visit        Endocrine    Type 2 diabetes mellitus without complication, without long-term current use of insulin ([facility] [address] ) - Primary       Other    Hypercalcemia    Relevant Orders    Comprehensive metabolic panel Lab Collect    PTH, intact Lab Collect Lab Collect   Other Visit Diagnoses     Essential hypertension        Relevant Medications    lisinopril (ZESTRIL) 20 mg tablet        RTC 3-mo       Reason for visit is   Chief Complaint   Patient presents with   • Virtual Regular Visit        Encounter provider Larry Avitia DO    Provider located at [address] [place] [address] ENDOCRINOO[facility] [address] [address] [phone]      Recent Visits  No visits were found meeting these conditions    Showing recent visits within past 7 days and meeting all other requirements  Today's Visits  Date Type Provider Dept   12/09/22 Telemedicine Barron Anderson Maralms, DO [facility]   Showing today's visits and meeting all other requirements  Future Appointments  No visits were found meeting these conditions  Showing future appointments within next 150 days and meeting all other requirements       The patient was identified by name and date of birth  Jun Fonseca was informed that this is a telemedicine visit and that the visit is being conducted through the [facility]  She agrees to proceed     My office door was closed  No one else was in the room  She acknowledged consent and understanding of privacy and security of the video platform  The patient has agreed to participate and understands they can discontinue the visit at any time  Patient is aware this is a billable service  Subjective  Jun Fonseca is a 71 y o  female for follow up of diabetes  Her daughter is joining her today during our virtual visit  Unice Officer reports no complaints  She is feeling well with no significant interval health events  We stopped her metformin last visit due AE and renal dosing recommendations  She reports resolution of medication related symptoms  For diabetes, she is on pioglitazone 15 mg daily  She is on [employer] sensor  [employer] unavailable for my review  Per Unice Officer, no hyperglycemia & no hyperglycemic symptoms  Occasionally, Asteres will alert of a low, however Unice Officer is asymptomatic  I told her that she is not experiencing true hypoglycemia, just enjoying well controlled glycemia  For hyperlipidemia, she is taking lipitor 20 mg daily  Gemfibrozil was discontinued at our previous visit  For hypertension she takes amlodipine 5 mg daily, lisinopril 10 mg daily       HPI     Past Medical History:   Diagnosis Date   • Diabetes mellitus ([facility] [address] )    • Hypertension        Past Surgical History:   Procedure Laterality Date   • ABDOMINAL SURGERY         Current Outpatient Medications Medication Sig Dispense Refill   • amLODIPine (NORVASC) 5 mg tablet TAKE ONE TABLET BY MOUTH EVERY DAY 90 tablet 5   • atorvastatin (LIPITOR) 20 mg tablet Take 1 tablet (20 mg total) by mouth daily 90 tablet 1   • Continuous Blood Gluc  (FreeStyle Franco 2 [place]) KIRSTIE Use 1 Device in the morning 1 each 0   • Continuous Blood Gluc Sensor (FreeStyle Franco 2 Sensor) MISC Use 1 application every 14 (fourteen) days 6 each 3   • lisinopril (ZESTRIL) 20 mg tablet Take 1 tablet (20 mg total) by mouth daily 90 tablet 1   • pioglitazone (ACTOS) 15 mg tablet Take 1 tablet (15 mg total) by mouth daily 90 tablet 1     No current facility-administered medications for this visit  Allergies   Allergen Reactions   • Penicillins Other (See Comments)     Unknown        Review of Systems   Constitutional: Negative for diaphoresis and unexpected weight change  Gastrointestinal: Negative for abdominal pain, nausea and vomiting  Endocrine: Negative for polydipsia and polyuria  All other systems reviewed and are negative  Video Exam    There were no vitals filed for this visit  Physical Exam  Constitutional:       General: She is not in acute distress  Appearance: Normal appearance  HENT:      Head: Normocephalic and atraumatic  Nose: Nose normal    Pulmonary:      Effort: Pulmonary effort is normal  No respiratory distress  Neurological:      Mental Status: She is alert        Comments: Fluent and cogent speech   Psychiatric:         Mood and Affect: Mood normal          Behavior: Behavior normal           I spent 22 minutes with patient today in which greater than 50% of the time was spent in counseling/coordination of care regarding diabetes

## 2023-01-07 DIAGNOSIS — E11.9 TYPE 2 DIABETES MELLITUS WITHOUT COMPLICATION, WITHOUT LONG-TERM CURRENT USE OF INSULIN (HCC): ICD-10-CM

## 2023-01-07 RX ORDER — PIOGLITAZONEHYDROCHLORIDE 15 MG/1
15 TABLET ORAL DAILY
Qty: 90 TABLET | Refills: 1 | Status: SHIPPED | OUTPATIENT
Start: 2023-01-07

## 2023-03-10 DIAGNOSIS — E78.2 MIXED HYPERLIPIDEMIA: ICD-10-CM

## 2023-03-10 RX ORDER — ATORVASTATIN CALCIUM 20 MG/1
TABLET, FILM COATED ORAL
Qty: 90 TABLET | Refills: 1 | Status: SHIPPED | OUTPATIENT
Start: 2023-03-10

## 2023-03-14 ENCOUNTER — HOSPITAL ENCOUNTER (OUTPATIENT)
Dept: MAMMOGRAPHY | Facility: IMAGING CENTER | Age: 73
Discharge: HOME | End: 2023-03-14

## 2023-03-14 VITALS — BODY MASS INDEX: 30.12 KG/M2 | WEIGHT: 170 LBS | HEIGHT: 63 IN

## 2023-03-14 DIAGNOSIS — Z12.31 ENCOUNTER FOR SCREENING MAMMOGRAM FOR MALIGNANT NEOPLASM OF BREAST: ICD-10-CM

## 2023-03-23 ENCOUNTER — OFFICE VISIT (OUTPATIENT)
Dept: GASTROENTEROLOGY | Facility: CLINIC | Age: 73
End: 2023-03-23

## 2023-03-23 VITALS
DIASTOLIC BLOOD PRESSURE: 90 MMHG | WEIGHT: 200 LBS | TEMPERATURE: 97.5 F | BODY MASS INDEX: 35.44 KG/M2 | SYSTOLIC BLOOD PRESSURE: 130 MMHG | HEIGHT: 63 IN

## 2023-03-23 DIAGNOSIS — R19.5 POSITIVE COLORECTAL CANCER SCREENING USING COLOGUARD TEST: Primary | ICD-10-CM

## 2023-03-23 RX ORDER — BISACODYL 5 MG/1
5 TABLET, DELAYED RELEASE ORAL ONCE
Qty: 2 TABLET | Refills: 0 | Status: SHIPPED | OUTPATIENT
Start: 2023-03-23 | End: 2023-03-23

## 2023-03-23 NOTE — PROGRESS NOTES
[facility] - Outpatient Consultation  Cuco Price 68 y o  female MRN: [id number]  Encounter: [id number]      Assessment and Plan    1  Positive Cologuard test  The patient denies any overt GI bleeding and to her knowledge she has no family history of colon cancer  She denies any GI complaints or alarm symptoms  She has never had a colonoscopy in the past   -Discussed positive cologuard results and that we recommend colonoscopy, colonoscopy was discussed in detail including the risks of bleeding, infection, bowel perforation, missed polyp    Follow-up as needed after colonoscopy    ______________________________________________________________________    History of Present Illness  Cuco Price is a 68 y o  female here for consultation of a positive Cologuard test   The patient denies any overt GI bleeding and to her knowledge she has no family history of colon cancer  She denies any GI complaints or alarm symptoms  She has never had a colonoscopy in the past       Review of Systems   Constitutional: Negative for activity change, appetite change, chills, fatigue, fever and unexpected weight change  Gastrointestinal: Negative for abdominal distention, abdominal pain, anal bleeding, blood in stool, constipation, diarrhea, nausea, rectal pain and vomiting  Musculoskeletal: Negative for back pain and gait problem  Psychiatric/Behavioral: Negative for confusion  Past Medical History  Past Medical History:   Diagnosis Date   • Diabetes mellitus ([facility] [address] )    • Hypertension        Past Social history  Past Surgical History:   Procedure Laterality Date   • ABDOMINAL SURGERY       Social History     Socioeconomic History   • Marital status:       Spouse name: Not on file   • Number of children: Not on file   • Years of education: Not on file   • Highest education level: Not on file   Occupational History   • Not on file   Tobacco Use   • Smoking status: Never   • Smokeless tobacco: Never   Vaping Use   • Vaping Use: Never used   Substance and Sexual Activity   • Alcohol use:  Yes     Alcohol/week: 1 0 standard drink     Types: 1 Glasses of wine per week     Comment: 1 glass of wine weekly   • Drug use: Never   • Sexual activity: Not on file   Other Topics Concern   • Not on file   Social History Narrative   • Not on file     Social Determinants of Health     Financial Resource Strain: Not on file   Food Insecurity: Not on file   Transportation Needs: Not on file   Physical Activity: Not on file   Stress: Not on file   Social Connections: Not on file   Intimate Partner Violence: Not on file   Housing Stability: Not on file     Social History     Substance and Sexual Activity   Alcohol Use Yes   • Alcohol/week: 1 0 standard drink   • Types: 1 Glasses of wine per week    Comment: 1 glass of wine weekly     Social History     Substance and Sexual Activity   Drug Use Never     Social History     Tobacco Use   Smoking Status Never   Smokeless Tobacco Never       Past Family History  Family History   Problem Relation Age of Onset   • Breast cancer Mother         [de-identified]   • Diabetes type II Mother    • No Known Problems Father    • No Known Problems Daughter    • No Known Problems Daughter    • No Known Problems Maternal Grandmother    • No Known Problems Maternal Grandfather    • No Known Problems Paternal Grandmother    • No Known Problems Paternal Grandfather    • No Known Problems Paternal Aunt    • No Known Problems Paternal Aunt    • No Known Problems Paternal Aunt    • No Known Problems Paternal Aunt    • Alcohol abuse Neg Hx    • Substance Abuse Neg Hx    • Mental illness Neg Hx        Current Medications  Current Outpatient Medications   Medication Sig Dispense Refill   • amLODIPine (NORVASC) 5 mg tablet TAKE ONE TABLET BY MOUTH EVERY DAY 90 tablet 3   • atorvastatin (LIPITOR) 20 mg tablet TAKE 1 TABLET BY MOUTH EVERY DAY 90 tablet 1   • Continuous Blood Gluc  (FreeStyle [place] 2 [place]) KIRSTIE Use 1 Device in the morning 1 each 0   • Continuous Blood Gluc Sensor (FreeStyle Franco 2 Sensor) MISC Use 1 application every 14 (fourteen) days 6 each 3   • lisinopril (ZESTRIL) 20 mg tablet Take 1 tablet (20 mg total) by mouth daily 90 tablet 1   • pioglitazone (ACTOS) 15 mg tablet TAKE 1 TABLET (15 MG TOTAL) BY MOUTH DAILY  90 tablet 1     No current facility-administered medications for this visit  Allergies  Allergies   Allergen Reactions   • Penicillins Other (See Comments)     Unknown          The following portions of the patient's history were reviewed and updated as appropriate: allergies, current medications, past medical history, past social history, past surgical history and problem list       Vitals  Vitals:    03/23/23 1043   BP: 130/90   BP Location: Left arm   Patient Position: Sitting   Cuff Size: Adult   Temp: 97 5 °F (36 4 °C)   TempSrc: Tympanic   Weight: 90 7 kg (200 lb)   Height: 5' 3" (1 6 m)         Physical Exam  Constitutional   General appearance: Patient is seated and in no acute distress, well appearing and well nourished  Head and Face   Head and face: Normal     Eyes   Conjunctiva and lids: No erythema, swelling or discharge  Anicteric  Ears, Nose, Mouth, and Throat   Hearing: Normal     Neck: Supple, trachea midline  Pulmonary   Respiratory effort: No increased work of breathing or signs of respiratory distress  Lungs: Clear to ascultation, no wheezes, rhonchi, or rales  Cardiovascular   Heart: Regular rate and rhythm, no murmurs gallops or rubs   Examination of extremities for edema and/or varicosities: Normal     Musculoskeletal   Gait and station: Normal   Skin   Skin and subcutaneous tissue: Warm, dry, and intact  No visible jaundice, lesions or rashes  Psychiatric   Judgment and insight: Normal  Recent and remote memory:  Normal  Mood and affect: Normal       Results  No visits with results within 1 Day(s) from this visit     Latest known visit with results is:   Appointment on 12/07/2022   Component Date Value   • Sodium 12/07/2022 138    • Potassium 12/07/2022 3 9    • Chloride 12/07/2022 106    • CO2 12/07/2022 27    • ANION GAP 12/07/2022 5    • BUN 12/07/2022 21    • Creatinine 12/07/2022 0 97    • Glucose, Fasting 12/07/2022 100 (H)    • Calcium 12/07/2022 10 0    • Corrected Calcium 12/07/2022 10 6 (H)    • AST 12/07/2022 15    • ALT 12/07/2022 22    • Alkaline Phosphatase 12/07/2022 85    • Total Protein 12/07/2022 8 0    • Albumin 12/07/2022 3 3 (L)    • Total Bilirubin 12/07/2022 0 71    • eGFR 12/07/2022 58    • Hemoglobin A1C 12/07/2022 5 9 (H)    • EAG 12/07/2022 123    • Phosphorus 12/07/2022 3 8    • Vit D, 25-Hydroxy 12/07/2022 50 3    • PTH 12/07/2022 57 0    • Creatinine, Ur 12/07/2022 136 0    • Microalbum  ,U,Random 12/07/2022 103 0 (H)    • Microalb Creat Ratio 12/07/2022 76 (H)    • Calcium, Ionized 12/07/2022 1 24    • Cholesterol 12/07/2022 168    • Triglycerides 12/07/2022 179 (H)    • HDL, Direct 12/07/2022 49 (L)    • LDL Calculated 12/07/2022 83    • Non-HDL-Chol (CHOL-HDL) 12/07/2022 119        Radiology Results  Mammo screening bilateral w 3d & cad    Result Date: 3/15/2023  Narrative: DIAGNOSIS: Encounter for screening mammogram for malignant neoplasm of breast TECHNIQUE: Digital screening mammography was performed  Computer Aided Detection (CAD) analyzed all applicable images  COMPARISONS: None available  RELEVANT HISTORY: Family Breast Cancer History: History of breast cancer in Mother  Family Medical History: Family medical history includes breast cancer in mother  Personal History: Hormone history includes birth control  No known relevant surgical history  No known relevant medical history  The patient is scheduled in a reminder system for screening mammography  8-10% of cancers will be missed on mammography  Management of a palpable abnormality must be based on clinical grounds    Patients will be notified of their results via letter from our facility  Accredited by [employer] of Radiology and FDA  RISK ASSESSMENT: 5 Year Tyrer-Cuzick: 3 11 % 10 Year Tyrer-Cuzick: 6 54 % Lifetime Tyrer-Cuzick: 8 73 % TISSUE DENSITY: The breasts are almost entirely fatty  INDICATION: Jun Fonseca is a 68 y o  female presenting for screening mammography  FINDINGS: There are no suspicious masses, grouped microcalcifications or areas of architectural distortion  The skin and nipple areolar complex are unremarkable  Impression: No mammographic evidence of malignancy  ASSESSMENT/BI-RADS CATEGORY: Left: 1 - Negative Right: 1 - Negative Overall: 1 - Negative RECOMMENDATION:      - Routine screening mammogram in 1 year for both breasts  Workstation ID: [id number]       Orders  No orders of the defined types were placed in this encounter

## 2023-04-22 ENCOUNTER — HOSPITAL ENCOUNTER (EMERGENCY)
Facility: HOSPITAL | Age: 73
Discharge: HOME | End: 2023-04-22
Attending: EMERGENCY MEDICINE

## 2023-04-22 ENCOUNTER — APPOINTMENT (EMERGENCY)
Dept: RADIOLOGY | Facility: HOSPITAL | Age: 73
End: 2023-04-22

## 2023-04-22 ENCOUNTER — APPOINTMENT (EMERGENCY)
Dept: CT IMAGING | Facility: HOSPITAL | Age: 73
End: 2023-04-22

## 2023-04-22 VITALS
RESPIRATION RATE: 19 BRPM | HEART RATE: 66 BPM | HEIGHT: 63 IN | TEMPERATURE: 99.1 F | BODY MASS INDEX: 35.44 KG/M2 | WEIGHT: 200 LBS | DIASTOLIC BLOOD PRESSURE: 91 MMHG | SYSTOLIC BLOOD PRESSURE: 201 MMHG | OXYGEN SATURATION: 95 %

## 2023-04-22 DIAGNOSIS — K20.90 ESOPHAGITIS: ICD-10-CM

## 2023-04-22 DIAGNOSIS — I10 HYPERTENSION: ICD-10-CM

## 2023-04-22 DIAGNOSIS — E27.8 ADRENAL NODULE (HCC): ICD-10-CM

## 2023-04-22 DIAGNOSIS — S32.010A COMPRESSION FRACTURE OF L1 VERTEBRA, INITIAL ENCOUNTER (HCC): Primary | ICD-10-CM
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
## 2023-04-22 DIAGNOSIS — K86.2 PANCREATIC CYST: ICD-10-CM

## 2023-04-22 DIAGNOSIS — S32.030A COMPRESSION FRACTURE OF L3 VERTEBRA, INITIAL ENCOUNTER (HCC): ICD-10-CM

## 2023-04-22 DIAGNOSIS — R91.1 PULMONARY NODULE: ICD-10-CM

## 2023-04-22 DIAGNOSIS — R11.0 NAUSEA: ICD-10-CM

## 2023-04-22 LAB
ALBUMIN SERPL BCP-MCNC: 4.8 G/DL (ref 3.5–5)
ALP SERPL-CCNC: 91 U/L (ref 34–104)
ALT SERPL W P-5'-P-CCNC: 15 U/L (ref 7–52)
ANION GAP SERPL CALCULATED.3IONS-SCNC: 13 MMOL/L (ref 4–13)
AST SERPL W P-5'-P-CCNC: 17 U/L (ref 13–39)
BASOPHILS # BLD AUTO: 0.04 THOUSANDS/ΜL (ref 0–0.1)
BASOPHILS NFR BLD AUTO: 0 % (ref 0–1)
BILIRUB SERPL-MCNC: 1.54 MG/DL (ref 0.2–1)
BUN SERPL-MCNC: 22 MG/DL (ref 5–25)
CALCIUM SERPL-MCNC: 10.5 MG/DL (ref 8.4–10.2)
CHLORIDE SERPL-SCNC: 94 MMOL/L (ref 96–108)
CO2 SERPL-SCNC: 29 MMOL/L (ref 21–32)
CREAT SERPL-MCNC: 1.04 MG/DL (ref 0.6–1.3)
EOSINOPHIL # BLD AUTO: 0.01 THOUSAND/ΜL (ref 0–0.61)
EOSINOPHIL NFR BLD AUTO: 0 % (ref 0–6)
ERYTHROCYTE [DISTWIDTH] IN BLOOD BY AUTOMATED COUNT: 13 % (ref 11.6–15.1)
GFR SERPL CREATININE-BSD FRML MDRD: 53 ML/MIN/1.73SQ M
GLUCOSE SERPL-MCNC: 169 MG/DL (ref 65–140)
HCT VFR BLD AUTO: 42.9 % (ref 34.8–46.1)
HGB BLD-MCNC: 14.5 G/DL (ref 11.5–15.4)
IMM GRANULOCYTES # BLD AUTO: 0.07 THOUSAND/UL (ref 0–0.2)
IMM GRANULOCYTES NFR BLD AUTO: 1 % (ref 0–2)
LIPASE SERPL-CCNC: 7 U/L (ref 11–82)
LYMPHOCYTES # BLD AUTO: 0.67 THOUSANDS/ΜL (ref 0.6–4.47)
LYMPHOCYTES NFR BLD AUTO: 6 % (ref 14–44)
MAGNESIUM SERPL-MCNC: 1.9 MG/DL (ref 1.9–2.7)
MCH RBC QN AUTO: 29.8 PG (ref 26.8–34.3)
MCHC RBC AUTO-ENTMCNC: 33.8 G/DL (ref 31.4–37.4)
MCV RBC AUTO: 88 FL (ref 82–98)
MONOCYTES # BLD AUTO: 0.85 THOUSAND/ΜL (ref 0.17–1.22)
MONOCYTES NFR BLD AUTO: 8 % (ref 4–12)
NEUTROPHILS # BLD AUTO: 9.54 THOUSANDS/ΜL (ref 1.85–7.62)
NEUTS SEG NFR BLD AUTO: 85 % (ref 43–75)
NRBC BLD AUTO-RTO: 0 /100 WBCS
PHOSPHATE SERPL-MCNC: 3.2 MG/DL (ref 2.3–4.1)
PLATELET # BLD AUTO: 232 THOUSANDS/UL (ref 149–390)
PMV BLD AUTO: 10.8 FL (ref 8.9–12.7)
POTASSIUM SERPL-SCNC: 3.3 MMOL/L (ref 3.5–5.3)
PROT SERPL-MCNC: 8.9 G/DL (ref 6.4–8.4)
RBC # BLD AUTO: 4.87 MILLION/UL (ref 3.81–5.12)
SODIUM SERPL-SCNC: 136 MMOL/L (ref 135–147)
WBC # BLD AUTO: 11.18 THOUSAND/UL (ref 4.31–10.16)

## 2023-04-22 RX ORDER — ONDANSETRON 4 MG/1
4 TABLET, ORALLY DISINTEGRATING ORAL EVERY 6 HOURS PRN
Qty: 12 TABLET | Refills: 0 | Status: SHIPPED | OUTPATIENT
Start: 2023-04-22

## 2023-04-22 RX ORDER — MAGNESIUM HYDROXIDE/ALUMINUM HYDROXICE/SIMETHICONE 120; 1200; 1200 MG/30ML; MG/30ML; MG/30ML
30 SUSPENSION ORAL ONCE
Status: COMPLETED | OUTPATIENT
Start: 2023-04-22 | End: 2023-04-22

## 2023-04-22 RX ORDER — LISINOPRIL 10 MG/1
20 TABLET ORAL ONCE
Status: COMPLETED | OUTPATIENT
Start: 2023-04-22 | End: 2023-04-22

## 2023-04-22 RX ORDER — AMLODIPINE BESYLATE 5 MG/1
5 TABLET ORAL ONCE
Status: COMPLETED | OUTPATIENT
Start: 2023-04-22 | End: 2023-04-22

## 2023-04-22 RX ORDER — PANTOPRAZOLE SODIUM 40 MG/1
40 TABLET, DELAYED RELEASE ORAL DAILY
Qty: 7 TABLET | Refills: 0 | Status: SHIPPED | OUTPATIENT
Start: 2023-04-22 | End: 2023-04-29

## 2023-04-22 RX ORDER — LIDOCAINE 50 MG/G
1 PATCH TOPICAL ONCE
Status: DISCONTINUED | OUTPATIENT
Start: 2023-04-22 | End: 2023-04-22 | Stop reason: HOSPADM

## 2023-04-22 RX ORDER — LIDOCAINE HYDROCHLORIDE 20 MG/ML
10 SOLUTION OROPHARYNGEAL ONCE
Status: COMPLETED | OUTPATIENT
Start: 2023-04-22 | End: 2023-04-22

## 2023-04-22 RX ORDER — HYDRALAZINE HYDROCHLORIDE 20 MG/ML
5 INJECTION INTRAMUSCULAR; INTRAVENOUS ONCE
Status: COMPLETED | OUTPATIENT
Start: 2023-04-22 | End: 2023-04-22

## 2023-04-22 RX ORDER — ONDANSETRON 2 MG/ML
4 INJECTION INTRAMUSCULAR; INTRAVENOUS ONCE
Status: COMPLETED | OUTPATIENT
Start: 2023-04-22 | End: 2023-04-22

## 2023-04-22 RX ORDER — PANTOPRAZOLE SODIUM 40 MG/10ML
40 INJECTION, POWDER, LYOPHILIZED, FOR SOLUTION INTRAVENOUS ONCE
Status: COMPLETED | OUTPATIENT
Start: 2023-04-22 | End: 2023-04-22

## 2023-04-22 RX ADMIN — ALUMINUM HYDROXIDE, MAGNESIUM HYDROXIDE, AND SIMETHICONE 30 ML: 200; 200; 20 SUSPENSION ORAL at 15:20

## 2023-04-22 RX ADMIN — LIDOCAINE HYDROCHLORIDE 10 ML: 20 SOLUTION ORAL at 15:20

## 2023-04-22 RX ADMIN — ONDANSETRON 4 MG: 2 INJECTION INTRAMUSCULAR; INTRAVENOUS at 14:57

## 2023-04-22 RX ADMIN — LIDOCAINE 1 PATCH: 50 PATCH TOPICAL at 13:30

## 2023-04-22 RX ADMIN — LISINOPRIL 20 MG: 10 TABLET ORAL at 14:57

## 2023-04-22 RX ADMIN — AMLODIPINE BESYLATE 5 MG: 5 TABLET ORAL at 14:57

## 2023-04-22 RX ADMIN — ONDANSETRON 4 MG: 2 INJECTION INTRAMUSCULAR; INTRAVENOUS at 13:30

## 2023-04-22 RX ADMIN — SODIUM CHLORIDE 1000 ML: 0.9 INJECTION, SOLUTION INTRAVENOUS at 13:28

## 2023-04-22 RX ADMIN — PANTOPRAZOLE SODIUM 40 MG: 40 INJECTION, POWDER, FOR SOLUTION INTRAVENOUS at 16:30

## 2023-04-22 RX ADMIN — IOHEXOL 100 ML: 350 INJECTION, SOLUTION INTRAVENOUS at 14:15

## 2023-04-22 RX ADMIN — HYDRALAZINE HYDROCHLORIDE 5 MG: 20 INJECTION INTRAMUSCULAR; INTRAVENOUS at 16:30

## 2023-04-22 NOTE — ED PROVIDER NOTES
History  Chief Complaint   Patient presents with   • Fall     Fall on Thursday denies head strike or LOC  Reports some back tenderness, nausea, and decrease appetite  Denies neck, or head pain  Denies any blood thinners  68year old female presents for evaluation of back pain which began 2 days ago after falling onto her buttocks  Patient denies head strike or LOC  She states she began feeling nauseated with diffuse abdominal discomfort the next day and has not been able to eat  History of SDH 3 years ago  No antiplatelets or anticoagulation  She has had a cough with post nasal drip, but no fevers or chills  Prior to Admission Medications   Prescriptions Last Dose Informant Patient Reported? Taking? Continuous Blood Gluc  (FreeStyle [place] 2 [place]) KIRSTIE   No No   Sig: Use 1 Device in the morning   Continuous Blood Gluc Sensor (FreeStyle Franco 2 Sensor) MISC   No No   Sig: Use 1 application every 14 (fourteen) days   amLODIPine (NORVASC) 5 mg tablet   No No   Sig: TAKE ONE TABLET BY MOUTH EVERY DAY   atorvastatin (LIPITOR) 20 mg tablet   No No   Sig: TAKE 1 TABLET BY MOUTH EVERY DAY   bisacodyl (DULCOLAX) 5 mg EC tablet   No No   Sig: Take 1 tablet (5 mg total) by mouth once for 1 dose   lisinopril (ZESTRIL) 20 mg tablet   No No   Sig: Take 1 tablet (20 mg total) by mouth daily   pioglitazone (ACTOS) 15 mg tablet   No No   Sig: TAKE 1 TABLET (15 MG TOTAL) BY MOUTH DAILY     polyethylene glycol (GOLYTELY) 4000 mL solution   No No   Sig: Take 4,000 mL by mouth once for 1 dose      Facility-Administered Medications: None       Past Medical History:   Diagnosis Date   • Diabetes mellitus ([facility] [address] )    • Hypertension        Past Surgical History:   Procedure Laterality Date   • ABDOMINAL SURGERY         Family History   Problem Relation Age of Onset   • Breast cancer Mother         [de-identified]   • Diabetes type II Mother    • No Known Problems Father    • No Known Problems Daughter    • No Known Problems Daughter    • No Known Problems Maternal Grandmother    • No Known Problems Maternal Grandfather    • No Known Problems Paternal Grandmother    • No Known Problems Paternal Grandfather    • No Known Problems Paternal Aunt    • No Known Problems Paternal Aunt    • No Known Problems Paternal Aunt    • No Known Problems Paternal Aunt    • Alcohol abuse Neg Hx    • Substance Abuse Neg Hx    • Mental illness Neg Hx      I have reviewed and agree with the history as documented  E-Cigarette/Vaping   • E-Cigarette Use Never User      E-Cigarette/Vaping Substances   • Nicotine No    • THC No    • CBD No    • Flavoring No    • Other No    • Unknown No      Social History     Tobacco Use   • Smoking status: Never   • Smokeless tobacco: Never   Vaping Use   • Vaping Use: Never used   Substance Use Topics   • Alcohol use: Yes     Alcohol/week: 1 0 standard drink     Types: 1 Glasses of wine per week     Comment: 1 glass of wine weekly   • Drug use: Never       Review of Systems    Physical Exam  Physical Exam  Vitals and nursing note reviewed  HENT:      Head: Normocephalic and atraumatic  Eyes:      Conjunctiva/sclera: Conjunctivae normal       Pupils: Pupils are equal, round, and reactive to light  Cardiovascular:      Rate and Rhythm: Normal rate and regular rhythm  Pulses: Normal pulses  Heart sounds: Normal heart sounds  Pulmonary:      Effort: Pulmonary effort is normal  No respiratory distress  Breath sounds: Normal breath sounds  Abdominal:      General: There is no distension  Palpations: Abdomen is soft  Tenderness: There is no abdominal tenderness  Musculoskeletal:         General: No deformity  Comments: No midline C/T/L spine tenderness  No step offs or deformities  Lumbar paraspinal tenderness bilaterally  Skin:     General: Skin is warm and dry  Neurological:      GCS: GCS eye subscore is 4  GCS verbal subscore is 5  GCS motor subscore is 6           Vital Signs  ED Triage Vitals [04/22/23 1317]   Temperature Pulse Respirations Blood Pressure SpO2   99 1 °F (37 3 °C) 70 20 (!) 242/102 95 %      Temp Source Heart Rate Source Patient Position - Orthostatic VS BP Location FiO2 (%)   Temporal Monitor Sitting Left arm --      Pain Score       --           Vitals:    04/22/23 1430 04/22/23 1505 04/22/23 1506 04/22/23 1630   BP: (!) 228/95   (!) 201/91   Pulse: 74   66   Patient Position - Orthostatic VS:  Lying Sitting          Visual Acuity  Visual Acuity    Flowsheet Row Most Recent Value   L Pupil Size (mm) 3   R Pupil Size (mm) 3          ED Medications  Medications   lidocaine (LIDODERM) 5 % patch 1 patch (1 patch Topical Medication Applied 4/22/23 1330)   sodium chloride 0 9 % bolus 1,000 mL (0 mL Intravenous Stopped 4/22/23 1519)   ondansetron (ZOFRAN) injection 4 mg (4 mg Intravenous Given 4/22/23 1330)   iohexol (OMNIPAQUE) 350 MG/ML injection (MULTI-DOSE) 100 mL (100 mL Intravenous Given 4/22/23 1415)   ondansetron (ZOFRAN) injection 4 mg (4 mg Intravenous Given 4/22/23 1457)   lisinopril (ZESTRIL) tablet 20 mg (20 mg Oral Given 4/22/23 1457)   amLODIPine (NORVASC) tablet 5 mg (5 mg Oral Given 4/22/23 1457)   Lidocaine Viscous HCl (XYLOCAINE) 2 % mucosal solution 10 mL (10 mL Swish & Swallow Given 4/22/23 1520)   aluminum-magnesium hydroxide-simethicone (MYLANTA) oral suspension 30 mL (30 mL Oral Given 4/22/23 1520)   hydrALAZINE (APRESOLINE) injection 5 mg (5 mg Intravenous Given 4/22/23 1630)   pantoprazole (PROTONIX) injection 40 mg (40 mg Intravenous Given 4/22/23 1630)       Diagnostic Studies  Results Reviewed     Procedure Component Value Units Date/Time    Comprehensive metabolic panel [[id number]]  (Abnormal) Collected: 04/22/23 1326    Lab Status: Final result Specimen: Blood from Arm, Right Updated: 04/22/23 1352     Sodium 136 mmol/L      Potassium 3 3 mmol/L      Chloride 94 mmol/L      CO2 29 mmol/L      ANION GAP 13 mmol/L      BUN 22 mg/dL Creatinine 1 04 mg/dL      Glucose 169 mg/dL      Calcium 10 5 mg/dL      AST 17 U/L      ALT 15 U/L      Alkaline Phosphatase 91 U/L      Total Protein 8 9 g/dL      Albumin 4 8 g/dL      Total Bilirubin 1 54 mg/dL      eGFR 53 ml/min/1 73sq m     Narrative:      [place] guidelines for Chronic Kidney Disease (CKD):   •  Stage 1 with normal or high GFR (GFR > 90 mL/min/1 73 square meters)  •  Stage 2 Mild CKD (GFR = 60-89 mL/min/1 73 square meters)  •  Stage 3A Moderate CKD (GFR = 45-59 mL/min/1 73 square meters)  •  Stage 3B Moderate CKD (GFR = 30-44 mL/min/1 73 square meters)  •  Stage 4 Severe CKD (GFR = 15-29 mL/min/1 73 square meters)  •  Stage 5 End Stage CKD (GFR <15 mL/min/1 73 square meters)  Note: GFR calculation is accurate only with a steady state creatinine    Phosphorus [[id number]]  (Normal) Collected: 04/22/23 1326    Lab Status: Final result Specimen: Blood from Arm, Right Updated: 04/22/23 1352     Phosphorus 3 2 mg/dL     Magnesium [[id number]]  (Normal) Collected: 04/22/23 1326    Lab Status: Final result Specimen: Blood from Arm, Right Updated: 04/22/23 1352     Magnesium 1 9 mg/dL     Lipase [[id number]]  (Abnormal) Collected: 04/22/23 1326    Lab Status: Final result Specimen: Blood from Arm, Right Updated: 04/22/23 1352     Lipase 7 u/L     CBC and differential [[id number]]  (Abnormal) Collected: 04/22/23 1326    Lab Status: Final result Specimen: Blood from Arm, Right Updated: 04/22/23 1333     WBC 11 18 Thousand/uL      RBC 4 87 Million/uL      Hemoglobin 14 5 g/dL      Hematocrit 42 9 %      MCV 88 fL      MCH 29 8 pg      MCHC 33 8 g/dL      RDW 13 0 %      MPV 10 8 fL      Platelets 092 Thousands/uL      nRBC 0 /100 WBCs      Neutrophils Relative 85 %      Immat GRANS % 1 %      Lymphocytes Relative 6 %      Monocytes Relative 8 %      Eosinophils Relative 0 %      Basophils Relative 0 %      Neutrophils Absolute 9 54 Thousands/µL      Immature Grans Absolute 0 07 Thousand/uL      Lymphocytes Absolute 0 67 Thousands/µL      Monocytes Absolute 0 85 Thousand/µL      Eosinophils Absolute 0 01 Thousand/µL      Basophils Absolute 0 04 Thousands/µL                  CT head without contrast   Final Result by Trini Howard MD (04/22 1233)      No acute intracranial abnormality  Stable chronic microangiopathic changes within the brain  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  Workstation performed: [id number]         CT abdomen pelvis with contrast   Final Result by Trini Howard MD (04/22 1218)      1  Recent superior endplate compression fractures at L1 and L3    2   Mild esophageal wall thickening which could reflect esophagitis  3   Thickening of the right adrenal gland with nodule in the left adrenal gland  There is a 1 1 cm left adrenal nodule  Although its imaging features are indeterminate, it does not have suspicious imaging features (heterogeneity, necrosis, irregular margins), therefore this is likely benign, and can be followed by non-contrast    abdomen CT or MRI in 12 months  If patient has history of adrenal hyperfunction, consider biochemical evaluation  Adrenal recommendation based on institutional consensus and Journal of [employer] of Radiology 2017;14:2237-5463      4   6 mm pancreatic tail cyst   Suggest evaluation with MRI abdomen with MRCP  5   4 mm right lower lobe lung nodule  Based on current Fleischner Society 2017 Guidelines on incidental pulmonary nodule, no routine follow-up is needed if the patient is low risk  If the patient is high risk, optional follow-up chest CT at 12 months can be considered                 I personally discussed most important findings on this study with Nancie Shah on 4/22/2023 at 3:00 PM                Workstation performed: [id number]         XR lumbar spine 2 or 3 views    (Results Pending)              Procedures  ECG 12 Lead Documentation Only    Date/Time: 4/22/2023 1:31 PM  Performed by: Scooby Mack MD  Authorized by: Scooby Mack MD     Indications / Diagnosis:  Fall, nausea  ECG reviewed by me, the ED Provider: yes    Patient location:  ED  Previous ECG:     Previous ECG:  Compared to current    Comparison ECG info:  4/9/22 normal sinus rhythm with RBBB    Similarity:  No change  Interpretation:     Interpretation: abnormal    Rate:     ECG rate:  70    ECG rate assessment: normal    Rhythm:     Rhythm: sinus rhythm    Ectopy:     Ectopy: none    QRS:     QRS axis:  Left    QRS intervals: Wide  Conduction:     Conduction: abnormal      Abnormal conduction: complete RBBB    ST segments:     ST segments:  Normal  T waves:     T waves: inverted      Inverted:  V2 and V3             ED Course  ED Course as of 04/22/23 1700   Sat Apr 22, 2023   1430 Blood Pressure(!): 242/102  Patient has not taken her medications since Thursday  Home antihypertensives ordered  1627 BP continues to be elevated at 242/114  5 mg IV hydralazine ordered  Patient still feeling nauseated after 8 mg IV zofran and GI cocktail  40 mg IV protonix ordered  1656 Blood Pressure(!): 201/91   1658 Patient tolerating PO and feels comfortable going home  Medical Decision Making  68year old female presents for evaluation of abdominal pain and nausea as well as lumbar back pain which began after falling onto her buttocks  No criteria for trauma activation  EKG without acute ischemic changes or arrhythmia on my independent interpretation  CT with esophagitis for which patient was given GI cocktail and IV protonix  Acute L1 and L3 compression fractures for which LSO brace ordered  Patient informed of incidental findings and need to follow up with PCP  Patient very hypertensive in the ED secondary to inability to take her medications    Home medications given as well as 5 mg IV hydralazine with improvement in blood pressure  PCP follow up for recheck  Return precautions provided  Amount and/or Complexity of Data Reviewed  Labs: ordered  Radiology: ordered  Risk  OTC drugs  Prescription drug management  Disposition  Final diagnoses:   Esophagitis   Compression fracture of L1 vertebra, initial encounter ([place] [address] )   Compression fracture of L3 vertebra, initial encounter ([place] [address] )   Fall, initial encounter   Hypertension   Pulmonary nodule   Adrenal nodule (HCC)   Pancreatic cyst   Nausea     Time reflects when diagnosis was documented in both MDM as applicable and the Disposition within this note     Time User Action Codes Description Comment    4/22/2023  3:08 PM Tayla Crutch Add [K20 90] Esophagitis     4/22/2023  3:09 PM DayronPatricia carias Add [S32 010A] Compression fracture of L1 vertebra, initial encounter ([place] [address] )     4/22/2023  3:09 PM Mercedes RIOJAS Add [S32 030A] Compression fracture of L3 vertebra, initial encounter ([place] [address] )     4/22/2023  3:12 PM Tayla Crutch Add [A84  TWQI] Fall, initial encounter     4/22/2023  3:12 PM Tayla Crutch Modify [K20 90] Esophagitis     4/22/2023  3:12 PM DayronBlancaPatricia Rangel Modify [S32 010A] Compression fracture of L1 vertebra, initial encounter ([place] [address] )     4/22/2023  3:13 PM DayronPatricia Add [I10] Hypertension     4/22/2023  3:15 PM Tayla Crutch Add [R91 1] Pulmonary nodule     4/22/2023  3:15 PM Tayla Crutch Add [E27 8] Adrenal nodule ([place] [address] )     4/22/2023  3:15 PM Tayla Crutch Add [K86 2] Pancreatic cyst     4/22/2023  4:47 PM Tayla Crutch Add [R11 0] Nausea       ED Disposition     ED Disposition   Discharge    Condition   Stable    Date/Time   Sat Apr 22, 2023  4:59 PM    Comment   Monique Pickens discharge to home/self care                 Follow-up Information     Follow up With Specialties Details Why Contact Info Additional [facility] Schedule an appointment as soon as possible for a visit in 1 week for re-evaluation of compression fractures [address] [facility] [address] [phone] [address], [address], [address], [address] [place] [address], [address] [facility]    [facility] G[facility]ology Schedule an appointment as soon as possible for a visit in 1 week for re-evaluation of esophagitis [address] [facility] [address] [facility], [address], [place] [address] [place], [place], [address]    Warden Porter, [address] [facility] Medicine Schedule an appointment as soon as possible for a visit in 2 days for recheck of your blood pressure and for further evaluation and monitoring of incidental findings on your CT today [address] [facility] [address] Emergency Department Emergency Medicine Go to  If symptoms worsen [address] [place], [phone]  [address] [facility] Emergency Department, [address] [place], [address] [place] [address], enedelia Scott 10          Patient's Medications   Discharge Prescriptions    ONDANSETRON (ZOFRAN ODT) 4 MG DISINTEGRATING TABLET    Take 1 tablet (4 mg total) by mouth every 6 (six) hours as needed for nausea or vomiting       Start Date: 4/22/2023 End Date: --       Order Dose: 4 mg       Quantity: 12 tablet    Refills: 0    PANTOPRAZOLE (PROTONIX) 40 MG TABLET    Take 1 tablet (40 mg total) by mouth daily for 7 days       Start Date: 4/22/2023 End Date: 4/29/2023       Order Dose: 40 mg       Quantity: 7 tablet    Refills: 0           PDMP Review     None          ED Provider  Electronically Signed by           Matt Chau MD  04/22/23 2274

## 2023-04-26 LAB
ATRIAL RATE: 70 BPM
P AXIS: 26 DEGREES
PR INTERVAL: 146 MS
QRS AXIS: -25 DEGREES
QRSD INTERVAL: 140 MS
QT INTERVAL: 446 MS
QTC INTERVAL: 481 MS
T WAVE AXIS: 48 DEGREES
VENTRICULAR RATE: 70 BPM

## 2023-05-04 ENCOUNTER — OFFICE VISIT (OUTPATIENT)
Dept: ENDOCRINOLOGY | Facility: CLINIC | Age: 73
End: 2023-05-04

## 2023-05-04 ENCOUNTER — RA CDI HCC (OUTPATIENT)
Dept: OTHER | Facility: HOSPITAL | Age: 73
End: 2023-05-04

## 2023-05-04 VITALS
HEIGHT: 63 IN | WEIGHT: 191 LBS | HEART RATE: 86 BPM | SYSTOLIC BLOOD PRESSURE: 140 MMHG | BODY MASS INDEX: 33.84 KG/M2 | DIASTOLIC BLOOD PRESSURE: 80 MMHG

## 2023-05-04 DIAGNOSIS — E66.01 OBESITY, MORBID (HCC): ICD-10-CM

## 2023-05-04 DIAGNOSIS — M80.00XA OSTEOPOROSIS WITH CURRENT PATHOLOGICAL FRACTURE, UNSPECIFIED OSTEOPOROSIS TYPE, INITIAL ENCOUNTER: ICD-10-CM

## 2023-05-04 DIAGNOSIS — E27.8 ADRENAL NODULE (HCC): ICD-10-CM

## 2023-05-04 DIAGNOSIS — E78.2 MIXED HYPERLIPIDEMIA: ICD-10-CM

## 2023-05-04 DIAGNOSIS — R80.9 TYPE 2 DIABETES MELLITUS WITH MICROALBUMINURIA, WITHOUT LONG-TERM CURRENT USE OF INSULIN (HCC): Primary | ICD-10-CM

## 2023-05-04 DIAGNOSIS — I10 ESSENTIAL HYPERTENSION: ICD-10-CM

## 2023-05-04 DIAGNOSIS — E11.29 TYPE 2 DIABETES MELLITUS WITH MICROALBUMINURIA, WITHOUT LONG-TERM CURRENT USE OF INSULIN (HCC): Primary | ICD-10-CM

## 2023-05-04 DIAGNOSIS — N18.31 STAGE 3A CHRONIC KIDNEY DISEASE (HCC): ICD-10-CM

## 2023-05-04 RX ORDER — DEXAMETHASONE 1 MG
1 TABLET ORAL ONCE
Qty: 1 TABLET | Refills: 0 | Status: SHIPPED | OUTPATIENT
Start: 2023-05-04 | End: 2023-05-04

## 2023-05-04 NOTE — PROGRESS NOTES
Veronica Chandler 68 y o  female MRN: [id number]    Encounter: [id number]      Assessment/Plan     1  Type 2 DM with CKD3a - this is very well controlled  I am recommending stopping pioglitazone due to new diagnosis of fracture  I advised of fracture risk association with this therapy  Will monitor off therapy  She has prior negative experience with metformin  Will plan to assess A1c with upcoming labs  She is in need of updated DM eye examination  Foot exam performed today  2  Hyperlipidemia - continue statin Rx      3  Hypertension - stable  Continue current Rx    4  Osteoporosis with fragility fracture of vertebral spine - new diagnosis  Counseled on diagnosis and pillars of treatment, including adequate calcium and Vit D intake, fall prevention, exercise, and pharmacotherapy  Pharmacologic therapy will be recommended, as we discussed today  Prior eval for hypercalcemia unremarkable with normal PTH, high nl phos, vit D 25-OH, and ionized calcium  May consider reclast due to strong fracture risk reduction in vertebral spine  Her GFR is not prohibitive for its use  However will consider Will be meeting with PCP to discuss PT, which is recommended  Nehal Ordonez has a script from me for DXA study, which I am requesting be completed prior to next scheduled follow up, along with relevant labs  Recommend initiation of calcium carbonate 600 mg bid with meals  5  Adrenal nodule - new diagnosis  Reviewed this finding with patient and discussed its evaluation  CT study without concerning features for malignancy  Advised need to assess for hormonal autonomy  CT study was with contrast, so unenhanced HU of nodule undetermined  As such, will include testing of plasma metanephrines  Given hypertension, will test for renin:jolie  Reviewed ODST protocol  This should be performed separate from other labs  Will include measurement of DHEAS with other labs as well   Will tentatively plan for 12-mo follow up CT imaging adrenal protocol Problem List Items Addressed This Visit        Genitourinary    Stage 3a chronic kidney disease ([facility] )       Other    Mixed hyperlipidemia    Obesity, morbid ([facility] )   Other Visit Diagnoses     Type 2 diabetes mellitus with microalbuminuria, without long-term current use of insulin ([facility] )    -  Primary    Relevant Orders    Comprehensive metabolic panel Lab Collect    HEMOGLOBIN A1C W/ EAG ESTIMATION Lab Collect    Essential hypertension        Adrenal nodule (HCC)        Relevant Medications    dexamethasone (DECADRON) 1 mg tablet    Other Relevant Orders    DHEA-sulfate Lab Collect    Aldosterone/Renin Ratio    Metanephrine, Fractionated Plasma Free    Cortisol Level, AM Specimen Lab Collect    Osteoporosis with current pathological fracture, unspecified osteoporosis type, initial encounter        Relevant Orders    Comprehensive metabolic panel Lab Collect    PTH, intact Lab Collect Lab Collect    Vitamin D 25 hydroxy Lab Collect    Phosphorus Lab Collect        RTC 4-6 weeks    CC: Diabetes, osteoporosis, adrenal nodule    History of Present Illness     HPI:    Delia Rodriguez returns today for follow up of diabetes  She is joined today by her daughter and grandson (Reign)  Her daughter Ivis Pittman) is contributing significantly to elements of the history  Delia Rodriguez had recent ED visit for Fall on 4 22 23  She feel backwards on her bottom @ standing height, leading to abdominal discomfort next day  ED eval showed compression fractures at L1 & L3 on CT, also incidental adrenal nodule on left 1 1 cm, with thickening of right adrenal gland noted  She reports prior history of shoulder fracture  She is on vit D3 5k units daily  No calcium supplementation  She is looking to schedule PT  She has not yet had an updated DXA study, although one has been ordered  Her back pain is significantly improved  She is wearing a brace  For diabetes, Delia Rodriguez is taking pioglitazone 15 mg daily  She is on [employer] sensor, scans infrequently  Report shows avg   Her last a1c 5 9%  She denies any hypoglycemia or hyperglycemic symptoms  She has not had an updated DM eye examination  For hypertension she takes lisinopril 10 mg daily and amlodipine 5 mg daily  For cholesterol she takes lipitor 20 mg daily  l     Review of Systems   Constitutional: Negative for fatigue and unexpected weight change  HENT: Negative for trouble swallowing and voice change  Eyes: Negative for photophobia and visual disturbance  Respiratory: Negative for shortness of breath  Cardiovascular: Negative for chest pain and palpitations  Gastrointestinal: Negative for abdominal pain, diarrhea, nausea and vomiting  Endocrine: Negative for polydipsia and polyuria  Musculoskeletal: Positive for back pain  Neurological: Negative for tremors, weakness and headaches  Psychiatric/Behavioral: Negative for agitation and behavioral problems  All other systems reviewed and are negative        Historical Information   Past Medical History:   Diagnosis Date    Diabetes mellitus ([facility] )     Hypertension      Past Surgical History:   Procedure Laterality Date    ABDOMINAL SURGERY       Social History   Social History     Substance and Sexual Activity   Alcohol Use Yes    Alcohol/week: 1 0 standard drink    Types: 1 Glasses of wine per week    Comment: 1 glass of wine weekly     Social History     Substance and Sexual Activity   Drug Use Never     Social History     Tobacco Use   Smoking Status Never   Smokeless Tobacco Never     Family History:   Family History   Problem Relation Age of Onset    Breast cancer Mother         [de-identified]    Diabetes type II Mother     No Known Problems Father     No Known Problems Daughter     No Known Problems Daughter     No Known Problems Maternal Grandmother     No Known Problems Maternal Grandfather     No Known Problems Paternal Grandmother     No Known Problems Paternal Grandfather     No Known Problems Paternal Aunt     "No Known Problems Paternal Aunt     No Known Problems Paternal Aunt     No Known Problems Paternal Aunt     Alcohol abuse Neg Hx     Substance Abuse Neg Hx     Mental illness Neg Hx        Meds/Allergies   Current Outpatient Medications   Medication Sig Dispense Refill    amLODIPine (NORVASC) 5 mg tablet TAKE ONE TABLET BY MOUTH EVERY DAY 90 tablet 3    atorvastatin (LIPITOR) 20 mg tablet TAKE 1 TABLET BY MOUTH EVERY DAY 90 tablet 1    Continuous Blood Gluc  (FreeStyle Franco 2 [place]) KIRSTIE Use 1 Device in the morning 1 each 0    Continuous Blood Gluc Sensor (FreeStyle Franco 2 Sensor) MISC Use 1 application every 14 (fourteen) days 6 each 3    dexamethasone (DECADRON) 1 mg tablet Take 1 tablet (1 mg total) by mouth 1 (one) time for 1 dose Take 1 mg dexamethasone between 10 - 11 pm the evening prior to morning labs for cortisol (7-9AM) 1 tablet 0    lisinopril (ZESTRIL) 20 mg tablet Take 1 tablet (20 mg total) by mouth daily 90 tablet 1    bisacodyl (DULCOLAX) 5 mg EC tablet Take 1 tablet (5 mg total) by mouth once for 1 dose 2 tablet 0    ondansetron (Zofran ODT) 4 mg disintegrating tablet Take 1 tablet (4 mg total) by mouth every 6 (six) hours as needed for nausea or vomiting (Patient not taking: Reported on 5/4/2023) 12 tablet 0    pantoprazole (PROTONIX) 40 mg tablet Take 1 tablet (40 mg total) by mouth daily for 7 days 7 tablet 0    polyethylene glycol (GOLYTELY) 4000 mL solution Take 4,000 mL by mouth once for 1 dose 4000 mL 0     No current facility-administered medications for this visit  Allergies   Allergen Reactions    Penicillins Other (See Comments)     Unknown        Objective   Vitals: Blood pressure 140/80, pulse 86, height 5' 3\" (1 6 m), weight 86 6 kg (191 lb)  Physical Exam  Vitals reviewed  Constitutional:       General: She is not in acute distress  HENT:      Head: Normocephalic and atraumatic  Eyes:      General: No scleral icterus       Conjunctiva/sclera: " Conjunctivae normal    Cardiovascular:      Pulses: no weak pulses          Dorsalis pedis pulses are 2+ on the right side and 2+ on the left side  Pulmonary:      Effort: Pulmonary effort is normal  No respiratory distress  Abdominal:      Palpations: Abdomen is soft  Tenderness: There is no abdominal tenderness  Musculoskeletal:      Right lower leg: No edema  Left lower leg: No edema  Feet:      Right foot:      Skin integrity: No ulcer, skin breakdown, erythema, warmth, callus or dry skin  Left foot:      Skin integrity: No ulcer, skin breakdown, erythema, warmth, callus or dry skin  Skin:     General: Skin is warm and dry  Neurological:      General: No focal deficit present  Mental Status: She is alert  Psychiatric:         Mood and Affect: Mood normal          Behavior: Behavior normal      Diabetic Foot Exam    Patient's shoes and socks removed  Right Foot/Ankle   Right Foot Inspection  Skin Exam: skin normal and skin intact  No dry skin, no warmth, no callus, no erythema, no maceration, no abnormal color, no pre-ulcer, no ulcer and no callus  Sensory   Vibration: intact  Monofilament testing: intact    Vascular  The right DP pulse is 2+  Left Foot/Ankle  Left Foot Inspection  Skin Exam: skin normal and skin intact  No dry skin, no warmth, no erythema, no maceration, normal color, no pre-ulcer, no ulcer and no callus  Sensory   Vibration: intact  Monofilament testing: intact    Vascular  The left DP pulse is 2+  Assign Risk Category  No deformity present  No loss of protective sensation  No weak pulses  Risk: 0      The history was obtained from the review of the chart, patient and family      Lab Results:   Lab Results   Component Value Date/Time    Hemoglobin A1C 5 9 (H) 12/07/2022 10:54 AM    Hemoglobin A1C 5 5 09/06/2022 01:36 PM    WBC 11 18 (H) 04/22/2023 01:26 PM    Hemoglobin 14 5 04/22/2023 01:26 PM    Hematocrit 42 9 04/22/2023 01:26 PM    MCV 88 04/22/2023 01:26 PM    Platelets 397 44/84/1686 01:26 PM    BUN 22 04/22/2023 01:26 PM    BUN 21 12/07/2022 10:54 AM    BUN 26 (H) 09/06/2022 01:36 PM    Potassium 3 3 (L) 04/22/2023 01:26 PM    Potassium 3 9 12/07/2022 10:54 AM    Potassium 4 2 09/06/2022 01:36 PM    Chloride 94 (L) 04/22/2023 01:26 PM    Chloride 106 12/07/2022 10:54 AM    Chloride 107 09/06/2022 01:36 PM    CO2 29 04/22/2023 01:26 PM    CO2 27 12/07/2022 10:54 AM    CO2 26 09/06/2022 01:36 PM    Creatinine 1 04 04/22/2023 01:26 PM    Creatinine 0 97 12/07/2022 10:54 AM    Creatinine 1 35 (H) 09/06/2022 01:36 PM    AST 17 04/22/2023 01:26 PM    AST 15 12/07/2022 10:54 AM    ALT 15 04/22/2023 01:26 PM    ALT 22 12/07/2022 10:54 AM    Albumin 4 8 04/22/2023 01:26 PM    Albumin 3 3 (L) 12/07/2022 10:54 AM    HDL, Direct 49 (L) 12/07/2022 10:54 AM    HDL, Direct 60 09/06/2022 01:36 PM    Triglycerides 179 (H) 12/07/2022 10:54 AM    Triglycerides 120 09/06/2022 01:36 PM     Lab Results   Component Value Date    LDLCALC 83 12/07/2022     4 22 2023  CT ABDOMEN AND PELVIS WITH IV CONTRAST     INDICATION:   Abdominal pain, acute, nonlocalized  diffuse abdominal pain and nausea  Poonam Snow 3 days ago      COMPARISON:  None      TECHNIQUE:  CT examination of the abdomen and pelvis was performed  Multiplanar 2D reformatted images were created from the source data      Radiation dose length product (DLP) for this visit:  1256 66 mGy-cm     This examination, like all CT scans performed in the [facility], was performed utilizing techniques to minimize radiation dose exposure, including the use of   iterative reconstruction and automated exposure control      IV Contrast:  100 mL of iohexol (OMNIPAQUE)  Enteric Contrast:  Enteric contrast was not administered      FINDINGS:     ABDOMEN     LOWER CHEST: 4 mm right lower lobe nodule series 2/8      Mild wall thickening at the distal esophagus which could reflect esophagitis      LIVER/BILIARY TREE: Unremarkable      GALLBLADDER:  No calcified gallstones  No pericholecystic inflammatory change      SPLEEN:  Unremarkable      PANCREAS:  6 mm pancreatic tail cyst series 2/48      ADRENAL GLANDS:  Mild thickening of right adrenal gland with nodular thickening of the left adrenal measuring 11 mm series 601/82           KIDNEYS/URETERS:  No hydronephrosis or urinary tract calculus  One or more sharply circumscribed subcentimeter renal hypodensities are present, too small to accurately characterize, and statistically most likely benign findings  According to recent   literature (Radiology 2019) no further workup of these findings is recommended      STOMACH AND BOWEL:    Unremarkable  Small bowel loops are within normal limits  Sigmoid colon diverticulosis without associated diverticulitis  Large amount of stool in the rectum      APPENDIX:  A normal appendix was visualized      ABDOMINOPELVIC CAVITY:  No ascites  No pneumoperitoneum  No lymphadenopathy      VESSELS:  Atherosclerotic changes are present  No evidence of aneurysm      PELVIS     REPRODUCTIVE ORGANS:  Unremarkable for patient's age      URINARY BLADDER:  Unremarkable      ABDOMINAL WALL/INGUINAL REGIONS:  There is a small fat-containing umbilical hernia      OSSEOUS STRUCTURES:  Recent superior endplate compression fractures are seen at L1 and L3  There is minimal paravertebral hematoma      IMPRESSION:     1  Recent superior endplate compression fractures at L1 and L3   2   Mild esophageal wall thickening which could reflect esophagitis  3   Thickening of the right adrenal gland with nodule in the left adrenal gland  There is a 1 1 cm left adrenal nodule  Although its imaging features are indeterminate, it does not have suspicious imaging features (heterogeneity, necrosis, irregular margins), therefore this is likely benign, and can be followed by non-contrast   abdomen CT or MRI in 12 months    If patient has history of adrenal "hyperfunction, consider biochemical evaluation  4 22 2023    LUMBAR SPINE     INDICATION:   lso brace      COMPARISON:  CT abdomen and pelvis obtained earlier the same day      VIEWS:  XR SPINE LUMBAR 2 OR 3 VIEWS INJURY        FINDINGS:     There are 5 non rib bearing lumbar vertebral bodies       Vertebral compression fractures L1 greater than L3 without appreciable change since the earlier CT      Alignment is unremarkable       Moderate degenerative disc disease lumbosacral junction      The pedicles appear intact      There are atherosclerotic calcifications  Soft tissues are otherwise unremarkable      IMPRESSION:     Compression fractures L1 and L3 visualized upright in brace with no change since the prior CT        Imaging Studies: I have personally reviewed pertinent reports  Portions of the record may have been created with voice recognition software  Occasional wrong word or \"sound a like\" substitutions may have occurred due to the inherent limitations of voice recognition software  Read the chart carefully and recognize, using context, where substitutions have occurred    "

## 2023-05-04 NOTE — PATIENT INSTRUCTIONS
Stop PIOGLITAZONE    Continue Vitamin D3 5000 units daily    Start Calcium carbonate 600 mg 1 to 2 times daily  This is best taken with meals  Goal calcium intake daily 1200 mg     Schedule DXA scan: To schedule this appointment, please contact Central Scheduling at [phone]      We will do a dexamethasone test  For this test, you will take 1 mg dexamethasone between 10 - 11pm the evening prior to 8AM blood work for cortisol     Labs prior to next visit in 4-6 weeks

## 2023-05-10 ENCOUNTER — OFFICE VISIT (OUTPATIENT)
Dept: FAMILY MEDICINE CLINIC | Facility: CLINIC | Age: 73
End: 2023-05-10

## 2023-05-10 VITALS
HEIGHT: 63 IN | BODY MASS INDEX: 33.68 KG/M2 | WEIGHT: 190.1 LBS | RESPIRATION RATE: 18 BRPM | SYSTOLIC BLOOD PRESSURE: 130 MMHG | HEART RATE: 69 BPM | DIASTOLIC BLOOD PRESSURE: 84 MMHG | OXYGEN SATURATION: 99 %

## 2023-05-10 DIAGNOSIS — E27.8 ADRENAL NODULE (HCC): ICD-10-CM

## 2023-05-10 DIAGNOSIS — I10 BENIGN ESSENTIAL HTN: ICD-10-CM

## 2023-05-10 DIAGNOSIS — K86.2 PANCREATIC CYST: ICD-10-CM

## 2023-05-10 DIAGNOSIS — E87.6 HYPOKALEMIA: ICD-10-CM

## 2023-05-10 DIAGNOSIS — K20.90 ESOPHAGITIS: ICD-10-CM

## 2023-05-10 DIAGNOSIS — S32.010A COMPRESSION FRACTURE OF L1 VERTEBRA, INITIAL ENCOUNTER (HCC): Primary | ICD-10-CM

## 2023-05-10 NOTE — PROGRESS NOTES
"Assessment/Plan:     Diagnoses and all orders for this visit:    Compression fracture of L1 vertebra, initial encounter ([facility] )    Pt discharged w/ back brace from ER  She notes she does not wear it every day because it causes her pain  Otherwise she denies any pain  She did have referral placed for Neurosx but has not yet made an appointment  Pt encouraged to make an appointment for f/u  Pancreatic cyst  -     MRI abdomen w wo contrast and mrcp; Future    Noted incidentally on CT in ER  \"6 mm pancreatic tail cyst   Suggest evaluation with MRI abdomen with MRCP\"  MRI ordered for further evaluation  Adrenal nodule ([facility] )  -     CT abdomen w wo contrast; Future    Noted incidentally on CT  \"There is a 1 1 cm left adrenal nodule  Although its imaging features are indeterminate, it does not have suspicious imaging features (heterogeneity, necrosis, irregular margins), therefore this is likely benign, and can be followed by non-contrast abdomen CT or MRI in 12 months  If patient has history of adrenal hyperfunction, consider biochemical evaluation  \" CT order placed to be done in 1 year  Patient is encouraged to call our office for any questions/concerns, persistent or worsening symptoms  Patient states they understand and agree with treatment plan  Benign essential HTN    Managed w/ Amlodipine & Lisinopril  Hypokalemia  -     Basic metabolic panel; Future      Potassium at 3 3  Repeat BMP  Esophagitis    Noted on CT  Patient encouraged to f/u w/ GI  Pt to f/u over the summer for AWV of sooner PRN  Subjective:      Patient ID: Nicanor Heard is a 68 y o  female  Pt here today for ER f/u  Per EMR review, pt \"presents for evaluation of abdominal pain and nausea as well as lumbar back pain which began after falling onto her buttocks  No criteria for trauma activation  EKG without acute ischemic changes or arrhythmia on my independent interpretation    CT with esophagitis for which patient was " "given GI cocktail and IV protonix  Acute L1 and L3 compression fractures for which LSO brace ordered  Patient informed of incidental findings and need to follow up with PCP  Patient very hypertensive in the ED secondary to inability to take her medications  Home medications given as well as 5 mg IV hydralazine with improvement in blood pressure  PCP follow up for recheck  Return precautions provided  \"  Pt notes today she is feeling well  She has not been wearing her LSO brace because she notes it makes her more uncomfortable            The following portions of the patient's history were reviewed and updated as appropriate: allergies, current medications, past family history, past medical history, past social history, past surgical history and problem list     Review of Systems    As noted per HPI  Objective:      /84   Pulse 69   Resp 18   Ht 5' 3\" (1 6 m)   Wt 86 2 kg (190 lb 1 6 oz)   SpO2 99%   BMI 33 67 kg/m²          Physical Exam  Vitals reviewed  Constitutional:       Appearance: Normal appearance  Cardiovascular:      Pulses: Normal pulses  Heart sounds: Normal heart sounds  Pulmonary:      Effort: Pulmonary effort is normal       Breath sounds: Normal breath sounds  Neurological:      Mental Status: She is alert and oriented to person, place, and time  Mental status is at baseline  Psychiatric:         Mood and Affect: Mood normal          Behavior: Behavior normal          Thought Content:  Thought content normal          Judgment: Judgment normal          "

## 2023-06-03 DIAGNOSIS — I10 ESSENTIAL HYPERTENSION: ICD-10-CM

## 2023-06-05 RX ORDER — LISINOPRIL 20 MG/1
TABLET ORAL
Qty: 90 TABLET | Refills: 1 | Status: SHIPPED | OUTPATIENT
Start: 2023-06-05

## 2023-06-08 ENCOUNTER — TELEPHONE (OUTPATIENT)
Dept: FAMILY MEDICINE CLINIC | Facility: CLINIC | Age: 73
End: 2023-06-08

## 2023-09-20 DIAGNOSIS — E78.2 MIXED HYPERLIPIDEMIA: ICD-10-CM

## 2023-09-20 RX ORDER — ATORVASTATIN CALCIUM 20 MG/1
TABLET, FILM COATED ORAL
Qty: 90 TABLET | Refills: 1 | Status: SHIPPED | OUTPATIENT
Start: 2023-09-20

## 2023-10-04 ENCOUNTER — LAB (OUTPATIENT)
Dept: LAB | Facility: HOSPITAL | Age: 73
End: 2023-10-04
Payer: MEDICARE

## 2023-10-04 DIAGNOSIS — E11.29 TYPE 2 DIABETES MELLITUS WITH MICROALBUMINURIA, WITHOUT LONG-TERM CURRENT USE OF INSULIN: ICD-10-CM

## 2023-10-04 DIAGNOSIS — E87.6 HYPOKALEMIA: ICD-10-CM

## 2023-10-04 DIAGNOSIS — E83.52 HYPERCALCEMIA: ICD-10-CM

## 2023-10-04 DIAGNOSIS — M80.00XA OSTEOPOROSIS WITH CURRENT PATHOLOGICAL FRACTURE, UNSPECIFIED OSTEOPOROSIS TYPE, INITIAL ENCOUNTER: ICD-10-CM

## 2023-10-04 DIAGNOSIS — R80.9 TYPE 2 DIABETES MELLITUS WITH MICROALBUMINURIA, WITHOUT LONG-TERM CURRENT USE OF INSULIN: ICD-10-CM

## 2023-10-04 DIAGNOSIS — E27.8 ADRENAL NODULE (HCC): ICD-10-CM

## 2023-10-04 LAB
25(OH)D3 SERPL-MCNC: 47.2 NG/ML (ref 30–100)
ALBUMIN SERPL BCP-MCNC: 4.1 G/DL (ref 3.5–5)
ALP SERPL-CCNC: 105 U/L (ref 34–104)
ALT SERPL W P-5'-P-CCNC: 15 U/L (ref 7–52)
ANION GAP SERPL CALCULATED.3IONS-SCNC: 11 MMOL/L
AST SERPL W P-5'-P-CCNC: 15 U/L (ref 13–39)
BILIRUB SERPL-MCNC: 0.77 MG/DL (ref 0.2–1)
BUN SERPL-MCNC: 22 MG/DL (ref 5–25)
CALCIUM SERPL-MCNC: 9.9 MG/DL (ref 8.4–10.2)
CHLORIDE SERPL-SCNC: 102 MMOL/L (ref 96–108)
CO2 SERPL-SCNC: 29 MMOL/L (ref 21–32)
CORTIS AM PEAK SERPL-MCNC: 13.1 UG/DL (ref 6.7–22.6)
CREAT SERPL-MCNC: 0.94 MG/DL (ref 0.6–1.3)
EST. AVERAGE GLUCOSE BLD GHB EST-MCNC: 171 MG/DL
GFR SERPL CREATININE-BSD FRML MDRD: 60 ML/MIN/1.73SQ M
GLUCOSE P FAST SERPL-MCNC: 142 MG/DL (ref 65–99)
HBA1C MFR BLD: 7.6 %
PHOSPHATE SERPL-MCNC: 3.4 MG/DL (ref 2.3–4.1)
POTASSIUM SERPL-SCNC: 4 MMOL/L (ref 3.5–5.3)
PROT SERPL-MCNC: 7.7 G/DL (ref 6.4–8.4)
PTH-INTACT SERPL-MCNC: 74.6 PG/ML (ref 12–88)
SODIUM SERPL-SCNC: 142 MMOL/L (ref 135–147)

## 2023-10-04 PROCEDURE — 82627 DEHYDROEPIANDROSTERONE: CPT

## 2023-10-04 PROCEDURE — 80053 COMPREHEN METABOLIC PANEL: CPT

## 2023-10-04 PROCEDURE — 84244 ASSAY OF RENIN: CPT

## 2023-10-04 PROCEDURE — 84100 ASSAY OF PHOSPHORUS: CPT

## 2023-10-04 PROCEDURE — 82533 TOTAL CORTISOL: CPT

## 2023-10-04 PROCEDURE — 82088 ASSAY OF ALDOSTERONE: CPT

## 2023-10-04 PROCEDURE — 83036 HEMOGLOBIN GLYCOSYLATED A1C: CPT

## 2023-10-04 PROCEDURE — 36415 COLL VENOUS BLD VENIPUNCTURE: CPT

## 2023-10-04 PROCEDURE — 83970 ASSAY OF PARATHORMONE: CPT

## 2023-10-04 PROCEDURE — 82306 VITAMIN D 25 HYDROXY: CPT

## 2023-10-05 LAB — DHEA-S SERPL-MCNC: 53.6 UG/DL (ref 20.4–186.6)

## 2023-10-07 ENCOUNTER — APPOINTMENT (OUTPATIENT)
Dept: LAB | Facility: CLINIC | Age: 73
End: 2023-10-07
Payer: MEDICARE

## 2023-10-07 DIAGNOSIS — E27.8 ADRENAL NODULE (HCC): ICD-10-CM

## 2023-10-07 PROCEDURE — 36415 COLL VENOUS BLD VENIPUNCTURE: CPT

## 2023-10-07 PROCEDURE — 83835 ASSAY OF METANEPHRINES: CPT

## 2023-10-08 LAB
ALDOST SERPL-MCNC: 7.9 NG/DL (ref 0–30)
ALDOST/RENIN PLAS-RTO: 32.4 {RATIO} (ref 0–30)
RENIN PLAS-CCNC: 0.24 NG/ML/HR (ref 0.17–5.38)

## 2023-10-09 ENCOUNTER — OFFICE VISIT (OUTPATIENT)
Dept: ENDOCRINOLOGY | Facility: CLINIC | Age: 73
End: 2023-10-09
Payer: MEDICARE

## 2023-10-09 VITALS
HEART RATE: 69 BPM | WEIGHT: 204 LBS | DIASTOLIC BLOOD PRESSURE: 100 MMHG | SYSTOLIC BLOOD PRESSURE: 160 MMHG | HEIGHT: 63 IN | BODY MASS INDEX: 36.14 KG/M2

## 2023-10-09 DIAGNOSIS — N18.31 STAGE 3A CHRONIC KIDNEY DISEASE (HCC): ICD-10-CM

## 2023-10-09 DIAGNOSIS — E11.65 TYPE 2 DIABETES MELLITUS WITH HYPERGLYCEMIA, WITHOUT LONG-TERM CURRENT USE OF INSULIN (HCC): ICD-10-CM

## 2023-10-09 DIAGNOSIS — M80.00XA OSTEOPOROSIS WITH CURRENT PATHOLOGICAL FRACTURE, UNSPECIFIED OSTEOPOROSIS TYPE, INITIAL ENCOUNTER: ICD-10-CM

## 2023-10-09 DIAGNOSIS — I10 ESSENTIAL HYPERTENSION: ICD-10-CM

## 2023-10-09 DIAGNOSIS — E11.29 TYPE 2 DIABETES MELLITUS WITH MICROALBUMINURIA, WITHOUT LONG-TERM CURRENT USE OF INSULIN: Primary | ICD-10-CM

## 2023-10-09 DIAGNOSIS — R80.9 TYPE 2 DIABETES MELLITUS WITH MICROALBUMINURIA, WITHOUT LONG-TERM CURRENT USE OF INSULIN: Primary | ICD-10-CM

## 2023-10-09 DIAGNOSIS — E27.8 ADRENAL NODULE (HCC): ICD-10-CM

## 2023-10-09 PROCEDURE — 99214 OFFICE O/P EST MOD 30 MIN: CPT | Performed by: STUDENT IN AN ORGANIZED HEALTH CARE EDUCATION/TRAINING PROGRAM

## 2023-10-09 RX ORDER — SPIRONOLACTONE 25 MG/1
25 TABLET ORAL DAILY
Qty: 30 TABLET | Refills: 3 | Status: SHIPPED | OUTPATIENT
Start: 2023-10-09

## 2023-10-09 RX ORDER — DEXAMETHASONE 1 MG
1 TABLET ORAL ONCE
Qty: 1 TABLET | Refills: 0 | Status: SHIPPED | OUTPATIENT
Start: 2023-10-09 | End: 2023-10-09

## 2023-10-09 NOTE — PROGRESS NOTES
Shanthi Montemayor 68 y.o. female MRN: [id number]    Encounter: [id number]      Assessment/Plan     1. 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 Pioglitazone was dc last visit due to fracture. Given history CKD, recommend SGTL2i. Reviewed pro/cons, including  mycotic infections. Will send script for jardiance 10 mg daily. Will arrange monitoring of a1c in several months, call clinic sooner if having concerns. 2. Hyperlipidemia - continue statin Rx.     3. Hypertension - uncontrolled. Positive case detection for hyperaldosteronism. Also history of spontaneous hypokalemia. Reviewed pathophysiology of hyperaldosteronism, its evaluation and management, including surgical. Patient not presently inclined to pursue more comprehensive diagnostic eval, not presently inclined to pursue surgery. Recommend initiation of spironolactone 25 mg daily. Labs in 2-weeks for potassium and renin    4. Osteoporosis with fracture of vertebral spine - new diagnosis. Fracture of spine occurred during fall on cement. Mechanism of injury may have justified fracture. Notwithstanding, updated DXA scan recommended, as dicussed during prior visit. Order for DXA is already available. Will discuss more at next scheduled follow up. 5. Adrenal nodule - new diagnosis. Reviewed this finding with patient and discussed its evaluation. CT study without concerning features for malignancy. Awaiting results of metanephrines. ODST not done to protocol, will repeat. Aldosterone/renin testing indicates positive screen (as above).      Problem List Items Addressed This Visit        Genitourinary    Stage 3a chronic kidney disease ([address])    Relevant Medications    spironolactone (ALDACTONE) 25 mg tablet   Other Visit Diagnoses     Type 2 diabetes mellitus with microalbuminuria, without long-term current use of insulin     -  Primary    Relevant Medications    Empagliflozin (Jardiance) 10 MG TABS tablet    Osteoporosis with current pathological fracture, unspecified osteoporosis type, initial encounter        Adrenal nodule (HCC)        Relevant Medications    dexamethasone (DECADRON) 1 mg tablet    Other Relevant Orders    Cortisol Level,7-9 AM Specimen    Dexamethasone    Type 2 diabetes mellitus with hyperglycemia, without long-term current use of insulin (HCC)        Relevant Medications    Continuous Blood Gluc Sensor (FreeStyle Franco 2 Sensor) MISC    Empagliflozin (Jardiance) 10 MG TABS tablet    Essential hypertension        Relevant Medications    spironolactone (ALDACTONE) 25 mg tablet    Other Relevant Orders    Basic metabolic panel Lab Collect    Aldosterone/Renin Ratio        RTC 2-mo    CC: Diabetes, osteoporosis, adrenal nodule    History of Present Illness     HPI:    Remi Lugo returns today for follow up of diabetes. She is joined today by her son in law and grandson (Carole). Remi Lugo recently completed labs. She attempted to do ODST on separate day, however took dexamethasone prior to testing of metanephrines (not cortisol). Her metanephrine levels are still pending from 10/7/23. Remi Lugo reports interval worsening of blood sugars after stopping pioglitazone, which she subsequently resumed. No hyperglycemic symptoms. No hypoglycemia. She typically wears franco sensor, but needs script. She has not had an updated DM eye examination. Remi Lugo had ED visit for fall on 4.22.23. She feel backwards on her bottom off concrete @ standing height. XR showed compression fractures at L1 & L3 on CT, also incidental adrenal nodule on left 1.1 cm, with thickening of right adrenal gland noted. She has not yet scheduled DXA scan. For hypertension she takes lisinopril 10 mg daily and amlodipine 5 mg daily. There is a history of low potassium. For cholesterol she takes lipitor 20 mg daily. Review of Systems   Constitutional: Negative for fatigue and unexpected weight change. HENT: Negative for trouble swallowing and voice change.     Eyes: Negative for photophobia and visual disturbance. Respiratory: Negative for shortness of breath. Cardiovascular: Negative for chest pain and palpitations. Gastrointestinal: Negative for abdominal pain, diarrhea, nausea and vomiting. Endocrine: Negative for polydipsia and polyuria. Musculoskeletal: Positive for back pain. Neurological: Negative for tremors, weakness and headaches. Psychiatric/Behavioral: Negative for agitation and behavioral problems. All other systems reviewed and are negative.       Historical Information   Past Medical History:   Diagnosis Date   • Diabetes mellitus ([address])    • Hypertension      Past Surgical History:   Procedure Laterality Date   • ABDOMINAL SURGERY       Social History   Social History     Substance and Sexual Activity   Alcohol Use Yes   • Alcohol/week: 1.0 standard drink of alcohol   • Types: 1 Glasses of wine per week    Comment: 1 glass of wine weekly     Social History     Substance and Sexual Activity   Drug Use Never     Social History     Tobacco Use   Smoking Status Never   Smokeless Tobacco Never     Family History:   Family History   Problem Relation Age of Onset   • Breast cancer Mother         80's   • Diabetes type II Mother    • No Known Problems Father    • No Known Problems Daughter    • No Known Problems Daughter    • No Known Problems Maternal Grandmother    • No Known Problems Maternal Grandfather    • No Known Problems Paternal Grandmother    • No Known Problems Paternal Grandfather    • No Known Problems Paternal Aunt    • No Known Problems Paternal Aunt    • No Known Problems Paternal Aunt    • No Known Problems Paternal Aunt    • Alcohol abuse Neg Hx    • Substance Abuse Neg Hx    • Mental illness Neg Hx        Meds/Allergies   Current Outpatient Medications   Medication Sig Dispense Refill   • amLODIPine (NORVASC) 5 mg tablet TAKE ONE TABLET BY MOUTH EVERY DAY 90 tablet 3   • atorvastatin (LIPITOR) 20 mg tablet TAKE 1 TABLET BY MOUTH EVERY DAY 90 tablet 1   • bisacodyl (DULCOLAX) 5 mg EC tablet Take 1 tablet (5 mg total) by mouth once for 1 dose 2 tablet 0   • Continuous Blood Gluc  (FreeStyle [place] 2 [place]) KIRSTIE Use 1 Device in the morning 1 each 0   • Continuous Blood Gluc Sensor (FreeStyle Franco 2 Sensor) MISC Use 1 application. every 14 (fourteen) days 6 each 3   • dexamethasone (DECADRON) 1 mg tablet Take 1 tablet (1 mg total) by mouth 1 (one) time for 1 dose Take 1 tablet at 11PM the evening prior to having 8AM bloodwork 1 tablet 0   • Empagliflozin (Jardiance) 10 MG TABS tablet Take 1 tablet (10 mg total) by mouth every morning 30 tablet 3   • lisinopril (ZESTRIL) 20 mg tablet TAKE 1 TABLET BY MOUTH EVERY DAY 90 tablet 1   • spironolactone (ALDACTONE) 25 mg tablet Take 1 tablet (25 mg total) by mouth daily 30 tablet 3   • polyethylene glycol (GOLYTELY) 4000 mL solution Take 4,000 mL by mouth once for 1 dose 4000 mL 0     No current facility-administered medications for this visit. Allergies   Allergen Reactions   • Penicillins Other (See Comments)     Unknown        Objective   Vitals: Blood pressure 160/100, pulse 69, height 5' 3" (1.6 m), weight 92.5 kg (204 lb). Physical Exam  Vitals reviewed. Constitutional:       General: She is not in acute distress. HENT:      Head: Normocephalic and atraumatic. Eyes:      General: No scleral icterus. Conjunctiva/sclera: Conjunctivae normal.   Pulmonary:      Effort: Pulmonary effort is normal. No respiratory distress. Abdominal:      Palpations: Abdomen is soft. Tenderness: There is no abdominal tenderness. Musculoskeletal:      Right lower leg: No edema. Left lower leg: No edema. Skin:     General: Skin is warm and dry. Neurological:      General: No focal deficit present. Mental Status: She is alert.    Psychiatric:         Mood and Affect: Mood normal.         Behavior: Behavior normal.       The history was obtained from the review of the chart, patient and family. Lab Results:   Lab Results   Component Value Date/Time    Hemoglobin A1C 7.6 (H) 10/04/2023 08:30 AM    Hemoglobin A1C 5.9 (H) 12/07/2022 10:54 AM    WBC 11.18 (H) 04/22/2023 01:26 PM    Hemoglobin 14.5 04/22/2023 01:26 PM    Hematocrit 42.9 04/22/2023 01:26 PM    MCV 88 04/22/2023 01:26 PM    Platelets 242 95/94/9981 01:26 PM    BUN 22 10/04/2023 08:30 AM    BUN 22 04/22/2023 01:26 PM    BUN 21 12/07/2022 10:54 AM    Potassium 4.0 10/04/2023 08:30 AM    Potassium 3.3 (L) 04/22/2023 01:26 PM    Potassium 3.9 12/07/2022 10:54 AM    Chloride 102 10/04/2023 08:30 AM    Chloride 94 (L) 04/22/2023 01:26 PM    Chloride 106 12/07/2022 10:54 AM    CO2 29 10/04/2023 08:30 AM    CO2 29 04/22/2023 01:26 PM    CO2 27 12/07/2022 10:54 AM    Creatinine 0.94 10/04/2023 08:30 AM    Creatinine 1.04 04/22/2023 01:26 PM    Creatinine 0.97 12/07/2022 10:54 AM    AST 15 10/04/2023 08:30 AM    AST 17 04/22/2023 01:26 PM    AST 15 12/07/2022 10:54 AM    ALT 15 10/04/2023 08:30 AM    ALT 15 04/22/2023 01:26 PM    ALT 22 12/07/2022 10:54 AM    Total Protein 7.7 10/04/2023 08:30 AM    Total Protein 8.9 (H) 04/22/2023 01:26 PM    Total Protein 8.0 12/07/2022 10:54 AM    Albumin 4.1 10/04/2023 08:30 AM    Albumin 4.8 04/22/2023 01:26 PM    Albumin 3.3 (L) 12/07/2022 10:54 AM    HDL, Direct 49 (L) 12/07/2022 10:54 AM    Triglycerides 179 (H) 12/07/2022 10:54 AM     Lab Results   Component Value Date    LDLCALC 83 12/07/2022 4.22.2023  CT ABDOMEN AND PELVIS WITH IV CONTRAST     INDICATION:   Abdominal pain, acute, nonlocalized  diffuse abdominal pain and nausea. Aiden Hernandes 3 days ago.     COMPARISON:  None.     TECHNIQUE:  CT examination of the abdomen and pelvis was performed. Multiplanar 2D reformatted images were created from the source data.     Radiation dose length product (DLP) for this visit:  1256.66 mGy-cm .   This examination, like all CT scans performed in the [facility], was performed utilizing techniques to minimize radiation dose exposure, including the use of   iterative reconstruction and automated exposure control.     IV Contrast:  100 mL of iohexol (OMNIPAQUE)  Enteric Contrast:  Enteric contrast was not administered.     FINDINGS:     ABDOMEN     LOWER CHEST: 4 mm right lower lobe nodule series 2/8.     Mild wall thickening at the distal esophagus which could reflect esophagitis.     LIVER/BILIARY TREE:  Unremarkable.     GALLBLADDER:  No calcified gallstones. No pericholecystic inflammatory change.     SPLEEN:  Unremarkable.     PANCREAS:  6 mm pancreatic tail cyst series 2/48.     ADRENAL GLANDS:  Mild thickening of right adrenal gland with nodular thickening of the left adrenal measuring 11 mm series 601/82.          KIDNEYS/URETERS:  No hydronephrosis or urinary tract calculus. One or more sharply circumscribed subcentimeter renal hypodensities are present, too small to accurately characterize, and statistically most likely benign findings. According to recent   literature (Radiology 2019) no further workup of these findings is recommended.     STOMACH AND BOWEL:    Unremarkable. Small bowel loops are within normal limits. Sigmoid colon diverticulosis without associated diverticulitis. Large amount of stool in the rectum.     APPENDIX:  A normal appendix was visualized.     ABDOMINOPELVIC CAVITY:  No ascites. No pneumoperitoneum. No lymphadenopathy.     VESSELS:  Atherosclerotic changes are present. No evidence of aneurysm.     PELVIS     REPRODUCTIVE ORGANS:  Unremarkable for patient's age.     URINARY BLADDER:  Unremarkable.     ABDOMINAL WALL/INGUINAL REGIONS:  There is a small fat-containing umbilical hernia.     OSSEOUS STRUCTURES:  Recent superior endplate compression fractures are seen at L1 and L3. There is minimal paravertebral hematoma.     IMPRESSION:     1.   Recent superior endplate compression fractures at L1 and L3.  2.  Mild esophageal wall thickening which could reflect esophagitis. 3.  Thickening of the right adrenal gland with nodule in the left adrenal gland. There is a 1.1 cm left adrenal nodule. Although its imaging features are indeterminate, it does not have suspicious imaging features (heterogeneity, necrosis, irregular margins), therefore this is likely benign, and can be followed by non-contrast   abdomen CT or MRI in 12 months. If patient has history of adrenal hyperfunction, consider biochemical evaluation. 4.22.2023    LUMBAR SPINE     INDICATION:   lso brace.     COMPARISON:  CT abdomen and pelvis obtained earlier the same day.     VIEWS:  XR SPINE LUMBAR 2 OR 3 VIEWS INJURY        FINDINGS:     There are 5 non rib bearing lumbar vertebral bodies.      Vertebral compression fractures L1 greater than L3 without appreciable change since the earlier CT.     Alignment is unremarkable.      Moderate degenerative disc disease lumbosacral junction.     The pedicles appear intact.     There are atherosclerotic calcifications. Soft tissues are otherwise unremarkable.     IMPRESSION:     Compression fractures L1 and L3 visualized upright in brace with no change since the prior CT.       Imaging Studies: I have personally reviewed pertinent reports. Portions of the record may have been created with voice recognition software. Occasional wrong word or "sound a like" substitutions may have occurred due to the inherent limitations of voice recognition software. Read the chart carefully and recognize, using context, where substitutions have occurred.

## 2023-10-09 NOTE — PATIENT INSTRUCTIONS
Labs for dexamethasone suppression as below, separate from other labs: The first step is to take 1 mg of dexamethasone (DEX) at night (between 11:00 pm and midnight or as close as possible to this time). You may take DEX with milk or an antacid. This can help prevent an upset stomach or heartburn. The second step is to have a sample of your blood drawn the next morning (between 7:00 and 9:00 am) to measure your body’s response to [address] in 2-weeks for potassium after starting spironolactone 25 mg daily. Start Jardiance 10 mg daily.  Call me if having any concerns    Schedule DXA scan at your convenience

## 2023-10-13 DIAGNOSIS — R80.9 TYPE 2 DIABETES MELLITUS WITH MICROALBUMINURIA, WITHOUT LONG-TERM CURRENT USE OF INSULIN: Primary | ICD-10-CM

## 2023-10-13 DIAGNOSIS — E11.29 TYPE 2 DIABETES MELLITUS WITH MICROALBUMINURIA, WITHOUT LONG-TERM CURRENT USE OF INSULIN: Primary | ICD-10-CM

## 2023-10-17 LAB
METANEPH FREE SERPL-MCNC: 13.8 PG/ML (ref 0–88)
NORMETANEPHRINE SERPL-MCNC: 71 PG/ML (ref 0–285.2)

## 2023-10-25 DIAGNOSIS — E11.29 TYPE 2 DIABETES MELLITUS WITH MICROALBUMINURIA, WITHOUT LONG-TERM CURRENT USE OF INSULIN: ICD-10-CM

## 2023-10-25 DIAGNOSIS — R80.9 TYPE 2 DIABETES MELLITUS WITH MICROALBUMINURIA, WITHOUT LONG-TERM CURRENT USE OF INSULIN: ICD-10-CM

## 2023-10-25 RX ORDER — BEXAGLIFLOZIN 20 MG/1
1 TABLET ORAL EVERY MORNING
Qty: 90 TABLET | Refills: 1 | Status: SHIPPED | OUTPATIENT
Start: 2023-10-25

## 2023-11-10 DIAGNOSIS — E11.29 TYPE 2 DIABETES MELLITUS WITH MICROALBUMINURIA, WITHOUT LONG-TERM CURRENT USE OF INSULIN: ICD-10-CM

## 2023-11-10 DIAGNOSIS — R80.9 TYPE 2 DIABETES MELLITUS WITH MICROALBUMINURIA, WITHOUT LONG-TERM CURRENT USE OF INSULIN: ICD-10-CM

## 2023-11-10 RX ORDER — BEXAGLIFLOZIN 20 MG/1
1 TABLET ORAL EVERY MORNING
Qty: 90 TABLET | Refills: 1 | Status: SHIPPED | OUTPATIENT
Start: 2023-11-10

## 2023-12-10 DIAGNOSIS — I10 ESSENTIAL HYPERTENSION: ICD-10-CM

## 2023-12-11 RX ORDER — SPIRONOLACTONE 25 MG/1
25 TABLET ORAL DAILY
Qty: 90 TABLET | Refills: 2 | Status: SHIPPED | OUTPATIENT
Start: 2023-12-11

## 2024-02-12 DIAGNOSIS — I10 ESSENTIAL HYPERTENSION: ICD-10-CM

## 2024-02-12 DIAGNOSIS — I10 BENIGN ESSENTIAL HTN: ICD-10-CM

## 2024-02-12 RX ORDER — AMLODIPINE BESYLATE 5 MG/1
5 TABLET ORAL DAILY
Qty: 90 TABLET | Refills: 3 | Status: SHIPPED | OUTPATIENT
Start: 2024-02-12

## 2024-02-12 RX ORDER — LISINOPRIL 20 MG/1
TABLET ORAL
Qty: 90 TABLET | Refills: 1 | Status: SHIPPED | OUTPATIENT
Start: 2024-02-12

## 2024-04-08 DIAGNOSIS — E78.2 MIXED HYPERLIPIDEMIA: ICD-10-CM

## 2024-04-09 RX ORDER — ATORVASTATIN CALCIUM 20 MG/1
TABLET, FILM COATED ORAL
Qty: 90 TABLET | Refills: 3 | Status: SHIPPED | OUTPATIENT
Start: 2024-04-09

## 2024-07-18 DIAGNOSIS — R80.9 TYPE 2 DIABETES MELLITUS WITH MICROALBUMINURIA, WITHOUT LONG-TERM CURRENT USE OF INSULIN (HCC): ICD-10-CM

## 2024-07-18 DIAGNOSIS — E11.29 TYPE 2 DIABETES MELLITUS WITH MICROALBUMINURIA, WITHOUT LONG-TERM CURRENT USE OF INSULIN (HCC): ICD-10-CM

## 2024-07-19 RX ORDER — BEXAGLIFLOZIN 20 MG
1 TABLET ORAL EVERY MORNING
Qty: 90 TABLET | Refills: 3 | Status: SHIPPED | OUTPATIENT
Start: 2024-07-19 | End: 2024-07-23 | Stop reason: SDUPTHER

## 2024-07-22 ENCOUNTER — PATIENT MESSAGE (OUTPATIENT)
Dept: ENDOCRINOLOGY | Facility: CLINIC | Age: 74
End: 2024-07-22

## 2024-07-22 DIAGNOSIS — R80.9 TYPE 2 DIABETES MELLITUS WITH MICROALBUMINURIA, WITHOUT LONG-TERM CURRENT USE OF INSULIN (HCC): ICD-10-CM

## 2024-07-22 DIAGNOSIS — E11.29 TYPE 2 DIABETES MELLITUS WITH MICROALBUMINURIA, WITHOUT LONG-TERM CURRENT USE OF INSULIN (HCC): ICD-10-CM

## 2024-07-23 RX ORDER — BEXAGLIFLOZIN 20 MG
1 TABLET ORAL EVERY MORNING
Qty: 90 TABLET | Refills: 3 | Status: SHIPPED | OUTPATIENT
Start: 2024-07-23

## 2024-10-12 DIAGNOSIS — I10 ESSENTIAL HYPERTENSION: ICD-10-CM

## 2024-10-14 RX ORDER — LISINOPRIL 20 MG/1
TABLET ORAL
Qty: 90 TABLET | Refills: 3 | Status: SHIPPED | OUTPATIENT
Start: 2024-10-14

## 2024-12-03 DIAGNOSIS — I10 ESSENTIAL HYPERTENSION: ICD-10-CM

## 2024-12-04 RX ORDER — SPIRONOLACTONE 25 MG/1
25 TABLET ORAL DAILY
Qty: 30 TABLET | Refills: 0 | Status: SHIPPED | OUTPATIENT
Start: 2024-12-04

## 2024-12-18 DIAGNOSIS — I10 ESSENTIAL HYPERTENSION: ICD-10-CM

## 2024-12-19 RX ORDER — SPIRONOLACTONE 25 MG/1
25 TABLET ORAL DAILY
Qty: 90 TABLET | Refills: 1 | Status: SHIPPED | OUTPATIENT
Start: 2024-12-19

## 2025-02-13 DIAGNOSIS — I10 BENIGN ESSENTIAL HTN: ICD-10-CM

## 2025-02-13 DIAGNOSIS — I10 ESSENTIAL HYPERTENSION: ICD-10-CM

## 2025-02-13 RX ORDER — LISINOPRIL 20 MG/1
20 TABLET ORAL DAILY
Qty: 90 TABLET | Refills: 0 | Status: SHIPPED | OUTPATIENT
Start: 2025-02-13

## 2025-02-13 NOTE — TELEPHONE ENCOUNTER
Name from pharmacy: AMLODIPINE BESYLATE 5 MG TAB         Will file in chart as: amLODIPine (NORVASC) 5 mg tablet    Sig: TAKE 1 TABLET BY MOUTH EVERY DAY    Disp: 90 tablet    Refills: 3    Start: 2/13/2025    Class: Normal    Non-formulary For: Benign essential HTN    Last ordered: 1 year ago (2/12/2024) by Luis Flores MD    Last refill: 10/13/2024    Rx #: [id number]    Cardiovascular:  Calcium Channel Blockers [id number]/13/2025 09:26 AM   Protocol Details BP completed in the last 6 months    Valid encounter within last 6 months      To be filled at: [facility] #[id number] - [place] - [phone] [id number]

## 2025-02-14 RX ORDER — AMLODIPINE BESYLATE 5 MG/1
5 TABLET ORAL DAILY
Qty: 90 TABLET | Refills: 3 | Status: SHIPPED | OUTPATIENT
Start: 2025-02-14

## 2025-08-19 ENCOUNTER — TELEPHONE (OUTPATIENT)
Dept: ENDOCRINOLOGY | Facility: CLINIC | Age: 75
End: 2025-08-19

## 2025-08-19 DIAGNOSIS — I10 ESSENTIAL HYPERTENSION: ICD-10-CM

## 2025-08-20 RX ORDER — SPIRONOLACTONE 25 MG/1
25 TABLET ORAL DAILY
Qty: 90 TABLET | Refills: 0 | Status: SHIPPED | OUTPATIENT
Start: 2025-08-20